# Patient Record
Sex: FEMALE | Race: WHITE | Employment: PART TIME | URBAN - METROPOLITAN AREA
[De-identification: names, ages, dates, MRNs, and addresses within clinical notes are randomized per-mention and may not be internally consistent; named-entity substitution may affect disease eponyms.]

---

## 2019-12-24 ENCOUNTER — OFFICE VISIT (OUTPATIENT)
Dept: FAMILY MEDICINE CLINIC | Age: 64
End: 2019-12-24

## 2019-12-24 VITALS
HEART RATE: 83 BPM | OXYGEN SATURATION: 96 % | SYSTOLIC BLOOD PRESSURE: 123 MMHG | DIASTOLIC BLOOD PRESSURE: 81 MMHG | HEIGHT: 62 IN | RESPIRATION RATE: 16 BRPM | BODY MASS INDEX: 37.17 KG/M2 | WEIGHT: 202 LBS | TEMPERATURE: 97.5 F

## 2019-12-24 DIAGNOSIS — M47.816 OSTEOARTHRITIS OF LUMBAR SPINE, UNSPECIFIED SPINAL OSTEOARTHRITIS COMPLICATION STATUS: ICD-10-CM

## 2019-12-24 DIAGNOSIS — F33.42 RECURRENT MAJOR DEPRESSIVE DISORDER, IN FULL REMISSION (HCC): Primary | ICD-10-CM

## 2019-12-24 DIAGNOSIS — K21.9 GASTROESOPHAGEAL REFLUX DISEASE, ESOPHAGITIS PRESENCE NOT SPECIFIED: ICD-10-CM

## 2019-12-24 DIAGNOSIS — Z79.1 ENCOUNTER FOR MONITORING CHRONIC NSAID THERAPY: ICD-10-CM

## 2019-12-24 DIAGNOSIS — Z23 ENCOUNTER FOR IMMUNIZATION: ICD-10-CM

## 2019-12-24 DIAGNOSIS — Z51.81 ENCOUNTER FOR MONITORING CHRONIC NSAID THERAPY: ICD-10-CM

## 2019-12-24 DIAGNOSIS — L40.9 PSORIASIS: ICD-10-CM

## 2019-12-24 RX ORDER — PANTOPRAZOLE SODIUM 40 MG/1
40 TABLET, DELAYED RELEASE ORAL DAILY
COMMUNITY
End: 2019-12-24 | Stop reason: SDUPTHER

## 2019-12-24 RX ORDER — IBUPROFEN 800 MG/1
TABLET ORAL
COMMUNITY
Start: 2019-12-14 | End: 2019-12-24 | Stop reason: SDUPTHER

## 2019-12-24 RX ORDER — BUPROPION HYDROCHLORIDE 150 MG/1
150 TABLET, EXTENDED RELEASE ORAL 2 TIMES DAILY
Qty: 180 TAB | Refills: 1 | Status: SHIPPED | OUTPATIENT
Start: 2019-12-24 | End: 2020-04-20 | Stop reason: SDUPTHER

## 2019-12-24 RX ORDER — IBUPROFEN 800 MG/1
800 TABLET ORAL
Qty: 90 TAB | Refills: 2 | Status: SHIPPED | OUTPATIENT
Start: 2019-12-24 | End: 2020-04-20 | Stop reason: SDUPTHER

## 2019-12-24 RX ORDER — PANTOPRAZOLE SODIUM 40 MG/1
40 TABLET, DELAYED RELEASE ORAL DAILY
Qty: 90 TAB | Refills: 1 | Status: SHIPPED | OUTPATIENT
Start: 2019-12-24 | End: 2020-04-20 | Stop reason: SDUPTHER

## 2019-12-24 RX ORDER — BETAMETHASONE DIPROPIONATE 0.5 MG/G
OINTMENT TOPICAL
COMMUNITY
Start: 2019-12-11 | End: 2021-02-26

## 2019-12-24 RX ORDER — CETIRIZINE HCL 10 MG
TABLET ORAL
COMMUNITY

## 2019-12-24 RX ORDER — BUPROPION HYDROCHLORIDE 150 MG/1
TABLET, EXTENDED RELEASE ORAL
COMMUNITY
Start: 2019-12-05 | End: 2019-12-24 | Stop reason: SDUPTHER

## 2019-12-24 NOTE — PROGRESS NOTES
Heron Mendez  71 y.o. female  1955  [id number]    [facility]  Progress Note     Encounter Date: 12/24/2019    Assessment and Plan:     Encounter Diagnoses     ICD-10-CM ICD-9-CM   1. Recurrent major depressive disorder, in full remission ([facility] [address] F33.42 296.36   2. Psoriasis L40.9 696.1   3. Osteoarthritis of lumbar spine, unspecified spinal osteoarthritis complication status W42.647 721.3   4. Gastroesophageal reflux disease, esophagitis presence not specified K21.9 530.81   5. Encounter for monitoring chronic NSAID therapy Z51.81 V58.83    Z79.1 V58.64   6. Encounter for immunization Z23 V03.89       1. Recurrent major depressive disorder, in full remission ([facility]  Stable. Continue current medication.   - buPROPion SR (WELLBUTRIN SR) 150 mg SR tablet; Take 1 Tab by mouth two (2) times a day. Dispense: 180 Tab; Refill: 1    2. Psoriasis  Release signed to obtain records from [address] [place] [address] and referral placed to [facility] office at patient's request.   - REFERRAL TO DERMATOLOGY    3. Osteoarthritis of lumbar spine, unspecified spinal osteoarthritis complication status  Patient has been on high-dose NSAID therapy for several months. She had been followed by Dr. Saima Castillo for DJD but has requested a referral for a second opinion on treatment options.   - REFERRAL TO PAIN MANAGEMENT  - ibuprofen (MOTRIN) 800 mg tablet; Take 1 Tab by mouth every eight (8) hours as needed for Pain. Dispense: 90 Tab; Refill: 2    4. Gastroesophageal reflux disease, esophagitis presence not specified  5. Encounter for monitoring chronic NSAID therapy  See problem #3  - pantoprazole (PROTONIX) 40 mg tablet; Take 1 Tab by mouth daily. Dispense: 90 Tab; Refill: 1    6. Encounter for immunization  - INFLUENZA VACCINE INACTIVATED (IIV), SUBUNIT, ADJUVANTED, IM  - [place] IMMUNIZ ADMIN,1 SINGLE/COMB VAC/TOXOID      I have discussed the diagnosis with the patient and the intended plan as seen in the above orders.   she has expressed understanding. The patient has received an after-visit summary and questions were answered concerning future plans. I have discussed medication side effects and warnings with the patient as well. Electronically Signed: Ce Villanueva MD    Current Medications after this visit     Current Outpatient Medications   Medication Sig    betamethasone dipropionate (DIPROLENE) 0.05 % ointment APPLY OINTMENT TOPICALLY TWICE DAILY TO KNEES. ALTERNATE WITH CALCIPOTRIENE    Lactobacillus acidophilus (PROBIOTIC PO) Take  by mouth.  cetirizine (ZYRTEC) 10 mg tablet Take  by mouth.  simethicone (GAS RELIEF PO) Take  by mouth.  secukinumab (COSENTYX) 150 mg/mL syrg by SubCUTAneous route.  buPROPion SR (WELLBUTRIN SR) 150 mg SR tablet Take 1 Tab by mouth two (2) times a day.  pantoprazole (PROTONIX) 40 mg tablet Take 1 Tab by mouth daily.  ibuprofen (MOTRIN) 800 mg tablet Take 1 Tab by mouth every eight (8) hours as needed for Pain. No current facility-administered medications for this visit. Medications Discontinued During This Encounter   Medication Reason    buPROPion SR (WELLBUTRIN SR) 150 mg SR tablet Reorder    pantoprazole (PROTONIX) 40 mg tablet Reorder    ibuprofen (MOTRIN) 800 mg tablet Reorder     ~~~~~~~~~~~~~~~~~~~~~~~~~~~~~~~~~~~~~~~~~~~~~~    Chief Complaint   Patient presents with   [address]       History provided by patient  History of Present Illness   Gary Beckman is a 71 y.o. female who presents to clinic today for:      [address] patient who presents to establish PCP care. I personally reviewed and updated the patient's medical, surgical, family and social history. Elsa Mendoza and SPECIAL[facility]  Dr. Gio Lo at [facility]. Patient decided to come to [address] due to insurance change. RECORDS  Provided by patient: none.       [facility]  Patient Care Team:  Tierney Jose, MD as PCP - General (Family Practice)      Establish care. Patient present with cc of [facility]. Reports a recent of depression which is controlled by wellbutrin. She has been taking protonix for GERD. She is taking  mg TID for spinal DJD  -  Patient has a history of psoriasis and had been followed by [facility] though due to insurance issues she needs a referral to a new dermatologist.     Health Maintenance  Completed by outside provider. Release for records signed.,  recommendation discussed and ordered with patient's permission. Health Maintenance Due   Topic Date Due    Hepatitis C Screening  1955    DTaP/Tdap/Td series (1 - Tdap) 05/03/1965    Shingrix Vaccine Age 50> (1 of 2) 05/03/2004    FOBT Q 1 YEAR AGE 50-75  05/03/2004    BREAST CANCER SCRN MAMMOGRAM  09/04/2014    GLAUCOMA SCREENING Q2Y  05/03/2019    Bone Densitometry (Dexa) Screening  05/03/2019    Pneumococcal 65+ years (1 of 1 - PPSV23) 05/03/2019     Review of Systems   Review of Systems   Constitutional: Negative for chills and fever. HENT: Negative for hearing loss. Respiratory: Negative for cough, hemoptysis, sputum production and shortness of breath. Cardiovascular: Negative for chest pain and palpitations. Gastrointestinal: Negative for abdominal pain, diarrhea, nausea and vomiting. Genitourinary: Negative for dysuria and urgency. Musculoskeletal: Positive for back pain. Negative for falls, joint pain and neck pain. Skin: Negative for itching and rash. Neurological: Negative for dizziness and headaches. Psychiatric/Behavioral: Negative for depression, hallucinations, substance abuse and suicidal ideas. Vitals/Objective:     Vitals:    12/24/19 1024   BP: 123/81   Pulse: 83   Resp: 16   Temp: 97.5 °F (36.4 °C)   TempSrc: Oral   SpO2: 96%   Weight: 202 lb (91.6 kg)   Height: 5' 2\" (1.575 m)     Body mass index is 36.95 kg/m².     Wt Readings from Last 3 Encounters:   12/24/19 202 lb (91.6 kg) Physical Exam  Constitutional:       General: She is not in acute distress. Appearance: Normal appearance. She is well-developed. She is obese. She is not ill-appearing or diaphoretic. HENT:      Head: Normocephalic and atraumatic. Right Ear: Tympanic membrane, ear canal and external ear normal. There is no impacted cerumen. Left Ear: Tympanic membrane, ear canal and external ear normal. There is no impacted cerumen. Mouth/Throat:      Pharynx: No oropharyngeal exudate or posterior oropharyngeal erythema. Eyes:      General:         Right eye: No discharge. Left eye: No discharge. Conjunctiva/sclera: Conjunctivae normal.      Pupils: Pupils are equal, round, and reactive to light. Cardiovascular:      Rate and Rhythm: Normal rate and regular rhythm. Heart sounds: S1 normal and S2 normal. No murmur. Pulmonary:      Effort: Pulmonary effort is normal.      Breath sounds: Normal breath sounds. No rales. Musculoskeletal:      Right lower leg: No edema. Left lower leg: No edema. Skin:     General: Skin is warm and dry. Neurological:      Mental Status: She is alert and oriented to person, place, and time. Psychiatric:         Mood and Affect: Mood normal.         Behavior: Behavior normal.          No results found for this or any previous visit (from the past 24 hour(s)). Disposition     Follow-up and Dispositions  ·   Return in about 3 months (around 3/24/2020) for Routine (Chronic Conditions). No future appointments. History   Patient's past medical, surgical and family histories were reviewed and updated.     Past Medical History:   Diagnosis Date    DJD (degenerative joint disease)     Osteoarthritis of lumbar spine 12/24/2019    Psoriasis     Recurrent major depressive disorder, in full remission ([facility] [address] 12/24/2019     Past Surgical History:   Procedure Laterality Date    HX MYOMECTOMY      HX WRIST FRACTURE TX       Family History Problem Relation Age of Onset    Hypertension Mother     Heart Attack Father      Social History     Tobacco Use    Smoking status: Former Smoker    Smokeless tobacco: Former User   Substance Use Topics    Alcohol use:  Yes     Alcohol/week: 2.0 standard drinks     Types: 2 Cans of beer per week     Comment: ocassionally    Drug use: Never       Allergies   No Known Allergies

## 2019-12-24 NOTE — PATIENT INSTRUCTIONS
Vaccine Information Statement Influenza (Flu) Vaccine (Inactivated or Recombinant): What You Need to Know Many Vaccine Information Statements are available in Portuguese and other languages. See www.immunize.org/vis Hojas de información sobre vacunas están disponibles en español y en muchos otros idiomas. Visite www.immunize.org/vis 1. Why get vaccinated? Influenza vaccine can prevent influenza (flu). Flu is a contagious disease that spreads around the [facility] every year, usually between October and May. Anyone can get the flu, but it is more dangerous for some people. Infants and young children, people 72years of age and older, pregnant women, and people with certain health conditions or a weakened immune system are at greatest risk of flu complications. Pneumonia, bronchitis, sinus infections and ear infections are examples of flu-related complications. If you have a medical condition, such as heart disease, cancer or diabetes, flu can make it worse. Flu can cause fever and chills, sore throat, muscle aches, fatigue, cough, headache, and runny or stuffy nose. Some people may have vomiting and diarrhea, though this is more common in children than adults. Each year thousands of people in the [facility] die from flu, and many more are hospitalized. Flu vaccine prevents millions of illnesses and flu-related visits to the doctor each year. 2. Influenza vaccines CDC recommends everyone 10months of age and older get vaccinated every flu season. Children 6 months through 6years of age may need 2 doses during a single flu season. Everyone else needs only 1 dose each flu season. It takes about 2 weeks for protection to develop after vaccination. There are many flu viruses, and they are always changing. Each year a new flu vaccine is made to protect against three or four viruses that are likely to cause disease in the upcoming flu season.  Even when the vaccine doesnt exactly match these viruses, it may still provide some protection. Influenza vaccine does not cause flu. Influenza vaccine may be given at the same time as other vaccines. 3. Talk with your health care provider Tell your vaccine provider if the person getting the vaccine: 
 Has had an allergic reaction after a previous dose of influenza vaccine, or has any severe, life-threatening allergies.  Has ever had Guillain-Barré Syndrome (also called GBS). In some cases, your health care provider may decide to postpone influenza vaccination to a future visit. People with minor illnesses, such as a cold, may be vaccinated. People who are moderately or severely ill should usually wait until they recover before getting influenza vaccine. Your health care provider can give you more information. 4. Risks of a reaction  Soreness, redness, and swelling where shot is given, fever, muscle aches, and headache can happen after influenza vaccine.  There may be a very small increased risk of Guillain-Barré Syndrome (GBS) after inactivated influenza vaccine (the flu shot). [facility] children who get the flu shot along with pneumococcal vaccine (PCV13), and/or DTaP vaccine at the same time might be slightly more likely to have a seizure caused by fever. Tell your health care provider if a child who is getting flu vaccine has ever had a seizure. People sometimes faint after medical procedures, including vaccination. Tell your provider if you feel dizzy or have vision changes or ringing in the ears. As with any medicine, there is a very remote chance of a vaccine causing a severe allergic reaction, other serious injury, or death. 5. What if there is a serious problem? An allergic reaction could occur after the vaccinated person leaves the clinic.  If you see signs of a severe allergic reaction (hives, swelling of the face and throat, difficulty breathing, a fast heartbeat, dizziness, or weakness), call 9-1-1 and get the person to the nearest hospital. 
 
For other signs that concern you, call your health care provider. Adverse reactions should be reported to the Vaccine Adverse Event Reporting System (VAERS). Your health care provider will usually file this report, or you can do it yourself. Visit the VAERS website at www.vaers. hhs.gov or call 1-504.123.8267. VAERS is only for reporting reactions, and VAERS staff do not give medical advice. 6. The National Vaccine Injury Compensation Program 
 
The [facility] Vaccine Injury Compensation Program (VICP) is a federal program that was created to compensate people who may have been injured by certain vaccines. Visit the VICP website at www.[facility].gov/vaccinecompensation or call 9-201.314.1774 to learn about the program and about filing a claim. There is a time limit to file a claim for compensation. 7. How can I learn more?  Ask your health care provider.  Call your local or state health department.  Contact the Centers for Disease Control and Prevention (CDC): 
- Call 7-693.715.7100 (1-800-CDC-INFO) or 
- Visit CDCs influenza website at www.cdc.gov/flu Vaccine Information Statement (Interim) Inactivated Influenza Vaccine 8/15/2019 
[address] Vital Line [id number]-53 Department of [facility] and [employer] Centers for Disease Control and Prevention Office Use Only

## 2019-12-24 NOTE — PROGRESS NOTES
Identified pt with two pt identifiers(name and ). Reviewed record in preparation for visit and have obtained necessary documentation. Chief Complaint   Patient presents with   [address] Maintenance Due   Topic    Hepatitis C Screening     DTaP/Tdap/Td series (1 - Tdap)    Shingrix Vaccine Age 50> (1 of 2)    FOBT Q 1 YEAR AGE 54-65     BREAST CANCER SCRN MAMMOGRAM     GLAUCOMA SCREENING Q2Y     Bone Densitometry (Dexa) Screening     Pneumococcal 65+ years (1 of 1 - PPSV23)    Influenza Age 5 to Adult    -Pt accepts flu shot  -Pt received Pneumococcal  In 2019    Coordination of Care Questionnaire:  :   1) Have you been to an emergency room, urgent care, or hospitalized since your last visit? If yes, where when, and reason for visit? [facility] for thrush       2. Have seen or consulted any other health care provider since your last visit? If yes, where when, and reason for visit? Dr. Khari Pro        Patient is accompanied by self  I have received verbal consent from Teja Brennan to discuss any/all medical information while they are present in the room.

## 2019-12-30 ENCOUNTER — DOCUMENTATION ONLY (OUTPATIENT)
Dept: FAMILY MEDICINE CLINIC | Age: 64
End: 2019-12-30

## 2019-12-30 NOTE — PROGRESS NOTES
Authorization Health Release Forms completed and faxed by 12/27/2019 to    [facility]- Dermatology- (f): [phone]) Gabriel Marquez): [phone]  Dr. Rigo Padilla): [phone]    Confirmation: OK

## 2020-01-02 ENCOUNTER — DOCUMENTATION ONLY (OUTPATIENT)
Dept: FAMILY MEDICINE CLINIC | Age: 65
End: 2020-01-02

## 2020-01-02 NOTE — PROGRESS NOTES
Authorization Health Release Health Forms completed and faxed by 1/2/2020 to   Dr. Maia Hays): [phone]    Confirmation: OK

## 2020-01-08 PROBLEM — J42 CHRONIC BRONCHITIS (HCC): Status: ACTIVE | Noted: 2020-01-08

## 2020-01-08 PROBLEM — F34.1 DYSTHYMIC: Status: ACTIVE | Noted: 2019-12-24

## 2020-01-09 ENCOUNTER — HOSPITAL ENCOUNTER (OUTPATIENT)
Dept: GENERAL RADIOLOGY | Age: 65
Discharge: HOME | End: 2020-01-09
Attending: PHYSICAL MEDICINE & REHABILITATION
Payer: MEDICARE

## 2020-01-09 DIAGNOSIS — M25.552 PAIN IN LEFT HIP: ICD-10-CM

## 2020-01-09 DIAGNOSIS — M54.10 RADICULOPATHY: ICD-10-CM

## 2020-01-09 DIAGNOSIS — M54.50 LOW BACK PAIN: ICD-10-CM

## 2020-01-09 PROCEDURE — 73502 X-RAY EXAM HIP UNI 2-3 VIEWS: CPT

## 2020-02-25 ENCOUNTER — HOSPITAL ENCOUNTER (OUTPATIENT)
Dept: MRI IMAGING | Age: 65
Discharge: HOME | End: 2020-02-25
Attending: PHYSICAL MEDICINE & REHABILITATION
Payer: MEDICARE

## 2020-02-25 ENCOUNTER — HOSPITAL ENCOUNTER (OUTPATIENT)
Dept: GENERAL RADIOLOGY | Age: 65
Discharge: HOME | End: 2020-02-25
Attending: PHYSICAL MEDICINE & REHABILITATION
Payer: MEDICARE

## 2020-02-25 DIAGNOSIS — M25.552 PAIN IN LEFT HIP: ICD-10-CM

## 2020-02-25 DIAGNOSIS — M16.12 UNILATERAL PRIMARY OSTEOARTHRITIS, LEFT HIP: ICD-10-CM

## 2020-02-25 PROCEDURE — 73721 MRI JNT OF LWR EXTRE W/O DYE: CPT

## 2020-02-25 PROCEDURE — 72114 X-RAY EXAM L-S SPINE BENDING: CPT

## 2020-04-20 ENCOUNTER — TELEPHONE (OUTPATIENT)
Dept: FAMILY MEDICINE CLINIC | Age: 65
End: 2020-04-20

## 2020-04-20 ENCOUNTER — VIRTUAL VISIT (OUTPATIENT)
Dept: FAMILY MEDICINE CLINIC | Age: 65
End: 2020-04-20

## 2020-04-20 VITALS — BODY MASS INDEX: 37.17 KG/M2 | HEIGHT: 62 IN | WEIGHT: 202 LBS

## 2020-04-20 DIAGNOSIS — F33.42 RECURRENT MAJOR DEPRESSIVE DISORDER, IN FULL REMISSION (HCC): ICD-10-CM

## 2020-04-20 DIAGNOSIS — Z51.81 ENCOUNTER FOR MONITORING CHRONIC NSAID THERAPY: ICD-10-CM

## 2020-04-20 DIAGNOSIS — M47.816 OSTEOARTHRITIS OF LUMBAR SPINE, UNSPECIFIED SPINAL OSTEOARTHRITIS COMPLICATION STATUS: Primary | ICD-10-CM

## 2020-04-20 DIAGNOSIS — Z79.1 ENCOUNTER FOR MONITORING CHRONIC NSAID THERAPY: ICD-10-CM

## 2020-04-20 RX ORDER — PREGABALIN 25 MG/1
25 CAPSULE ORAL 2 TIMES DAILY
Qty: 14 CAP | Refills: 0 | Status: SHIPPED | OUTPATIENT
Start: 2020-04-20 | End: 2020-05-08 | Stop reason: SINTOL

## 2020-04-20 RX ORDER — PANTOPRAZOLE SODIUM 40 MG/1
40 TABLET, DELAYED RELEASE ORAL DAILY
Qty: 90 TAB | Refills: 1 | Status: SHIPPED | OUTPATIENT
Start: 2020-04-20 | End: 2021-02-26 | Stop reason: SDUPTHER

## 2020-04-20 RX ORDER — BUPROPION HYDROCHLORIDE 150 MG/1
150 TABLET, EXTENDED RELEASE ORAL 2 TIMES DAILY
Qty: 180 TAB | Refills: 0 | Status: SHIPPED | OUTPATIENT
Start: 2020-04-20 | End: 2020-04-20 | Stop reason: SDUPTHER

## 2020-04-20 RX ORDER — IBUPROFEN 800 MG/1
800 TABLET ORAL
Qty: 90 TAB | Refills: 2 | Status: SHIPPED | OUTPATIENT
Start: 2020-04-20 | End: 2020-06-30

## 2020-04-20 RX ORDER — BUPROPION HYDROCHLORIDE 150 MG/1
150 TABLET, EXTENDED RELEASE ORAL 2 TIMES DAILY
Qty: 180 TAB | Refills: 0 | Status: SHIPPED | OUTPATIENT
Start: 2020-04-20 | End: 2021-02-26 | Stop reason: SDUPTHER

## 2020-04-20 NOTE — TELEPHONE ENCOUNTER
Pt called stating the 4 rxs that were sent in today, ibuprofen 800mg, pregabalin 25mg, pantoprazole 40mg, bupropion SR 150mg, require Pas. She stated she is in a lot of pain and really needs the pregabalin.

## 2020-04-20 NOTE — PROGRESS NOTES
Identified pt with two pt identifiers(name and ). Reviewed record in preparation for visit and have obtained necessary documentation. Chief Complaint   Patient presents with    Medication Refill    Leg Pain     pain radiates from spine to her leg        Health Maintenance Due   Topic    Hepatitis C Screening     Lipid Screen     Shingrix Vaccine Age 50> (1 of 2)    Breast Cancer Screen Mammogram     GLAUCOMA SCREENING Q2Y     Bone Densitometry (Dexa) Screening     Pneumococcal 65+ years (2 of 2 - PPSV23)    Medicare Yearly Exam        Visit Vitals  Height 5' 2\" (1.575 m)   Weight 202 lb (91.6 kg)   Body Mass Index 36.95 kg/m²         Coordination of Care Questionnaire:  :   1) Have you been to an emergency room, urgent care, or hospitalized since your last visit? NO      2. Have seen or consulted any other health care provider since your last visit? NO          Patient is accompanied by SELF I have received verbal consent from Clayborne Kussmaul to discuss any/all medical information while they are present in the room.

## 2020-04-20 NOTE — PROGRESS NOTES
Zion Rivera  71 y.o. female  1955  [id number]    [facility] FAMILY MEDICINE  Progress Note     Encounter Date: 4/20/2020    Zion Rivera is a 71 y.o. female who was seen by synchronous (real-time) audio-video technology on 4/20/2020. She and/or her healthcare decision maker is aware that this patient-initiated Telehealth encounter is a billable service, with coverage as determined by her insurance carrier. She  is aware that she may receive a bill and has provided verbal consent to proceed: Yes    I was at home while conducting this encounter. The patient was checked in/\"roomed\" by Greig Jeans, CMA via telephone in the office. The patient was at home during the encounter. This visit was completed virtually using Doxy. me  Assessment and Plan:     Encounter Diagnoses     ICD-10-CM ICD-9-CM   1. Osteoarthritis of lumbar spine, unspecified spinal osteoarthritis complication status S84.816 721.3   2. Recurrent major depressive disorder, in full remission ([facility] [address] F33.42 296.36   3. Encounter for monitoring chronic NSAID therapy Z51.81 V58.83    Z79.1 V58.64       1. Osteoarthritis of lumbar spine, unspecified spinal osteoarthritis complication status  Patient to call orthopedic to discuss pain medication until she can complete MRI study and further management. Trial of lyrica at low dose for pain; patient was unable to tolerate gabapentin. - ibuprofen (MOTRIN) 800 mg tablet; Take 1 Tab by mouth every eight (8) hours as needed for Pain. Dispense: 90 Tab; Refill: 2  - pregabalin (LYRICA) 25 mg capsule; Take 1 Cap by mouth two (2) times a day. Max Daily Amount: 50 mg. Dispense: 14 Cap; Refill: 0    2. Recurrent major depressive disorder, in full remission ([facility] [address]  Mood is stable. - buPROPion SR (WELLBUTRIN SR) 150 mg SR tablet; Take 1 Tab by mouth two (2) times a day. Dispense: 180 Tab; Refill: 0    3.  Encounter for monitoring chronic NSAID therapy  Continue PPI while continuing NSAID therapy. - pantoprazole (PROTONIX) 40 mg tablet; Take 1 Tab by mouth daily. Dispense: 90 Tab; Refill: 1      We discussed the expected course, resolution and complications of the diagnosis(es) in detail. Medication risks, benefits, costs, interactions, and alternatives were discussed as indicated. I advised her to contact the office if her condition worsens, changes or fails to improve as anticipated. She expressed understanding with the diagnosis(es) and plan. CPT Codes 05065-70353 for Established Patients may apply to this Telehealth Visit  Time-based coding, delete if not needed: I spent at least 25 minutes with this established patient, and >50% of the time was spent counseling and/or coordinating care regarding back pain and depression    Pursuant to the emergency declaration under the [address] and the [place] [address] [facility] authority and the [employer] Act, this Virtual  Visit was conducted, with patient's consent, to reduce the patient's risk of exposure to COVID-19 and provide continuity of care for an established patient. Services were provided through a video synchronous discussion virtually to substitute for in-person clinic visit. Electronically Signed: Mosie Snellen, MD    Current Medications after this visit     Current Outpatient Medications   Medication Sig    ibuprofen (MOTRIN) 800 mg tablet Take 1 Tab by mouth every eight (8) hours as needed for Pain.  pregabalin (LYRICA) 25 mg capsule Take 1 Cap by mouth two (2) times a day. Max Daily Amount: 50 mg.  pantoprazole (PROTONIX) 40 mg tablet Take 1 Tab by mouth daily.  buPROPion SR (WELLBUTRIN SR) 150 mg SR tablet Take 1 Tab by mouth two (2) times a day.  Lactobacillus acidophilus (PROBIOTIC PO) Take  by mouth.  cetirizine (ZYRTEC) 10 mg tablet Take  by mouth.  simethicone (GAS RELIEF PO) Take  by mouth.     betamethasone dipropionate (DIPROLENE) 0.05 % ointment APPLY OINTMENT TOPICALLY TWICE DAILY TO KNEES. ALTERNATE WITH CALCIPOTRIENE    secukinumab (COSENTYX) 150 mg/mL syrg by SubCUTAneous route. No current facility-administered medications for this visit. Medications Discontinued During This Encounter   Medication Reason    ibuprofen (MOTRIN) 800 mg tablet Reorder    buPROPion SR (WELLBUTRIN SR) 150 mg SR tablet Reorder    pantoprazole (PROTONIX) 40 mg tablet Reorder    buPROPion SR (WELLBUTRIN SR) 150 mg SR tablet Reorder     ~~~~~~~~~~~~~~~~~~~~~~~~~~~~~~~~~~~~~~~~~~~~~~    Chief Complaint   Patient presents with    Medication Refill    Leg Pain     pain radiates from spine to her leg       History of Present Illness   Bryan Baez is a 71 y.o. female who presents for:    Depression Review:  Patient is seen for depression. Ongoig symptoms include:  none. Patient denies: anhedonia, depressed mood, hopelessness, poor concentration, psychomotor agitation and psychomotor retardation suicidal ideation, homocidal ideation. Current treatment includes Wellbutrin and no other therapies. Reported side effects from the treatment: none    Back pain  Patient present with cc of back pain. Patient reports that she has been followed by pain and orthopedic surgeon. Dr. Shea Salinas has been following and has bee treating her with tramadol and gabapentin. She states that the gabapentin did not make her feel right and she was stopped taking the medication. Gastroesophageal Reflux:  Current control of Symptoms: Controlled  Hiatal Hernia: No  Current Medications:omeprazole  The patient has no history melena or bright red blood in the stools. The patient is aware of diet changes needed, elevating the head of the bed and appropriate use of antacids. Review of Systems   Review of Systems   Constitutional: Negative for chills and fever. HENT: Negative for congestion, ear discharge and sore throat.     Eyes: Negative for double vision, photophobia and discharge. Respiratory: Negative for cough, sputum production, shortness of breath and wheezing. Cardiovascular: Negative for chest pain, palpitations and leg swelling. Gastrointestinal: Negative for diarrhea, nausea and vomiting. Genitourinary: Negative for dysuria and urgency. Musculoskeletal: Positive for back pain. Skin: Negative. Neurological: Positive for tingling. Negative for dizziness, tremors, speech change, focal weakness and headaches. Vitals/Objective:     Due to this being a TeleHealth evaluation, many elements of the physical examination are unable to be assessed. Physical Exam  Constitutional:       General: She is not in acute distress. Appearance: Normal appearance. She is not ill-appearing. HENT:      Head: Normocephalic and atraumatic. Right Ear: External ear normal.      Left Ear: External ear normal.      Mouth/Throat:      Mouth: Mucous membranes are moist.   Eyes:      General:         Right eye: No discharge. Left eye: No discharge. Extraocular Movements: Extraocular movements intact. Conjunctiva/sclera: Conjunctivae normal.   Neck:      Musculoskeletal: Normal range of motion. Pulmonary:      Effort: Pulmonary effort is normal.      Comments: No visualized signs of difficulty breathing or respiratory distress  Skin:     Coloration: Skin is not pale. Findings: No erythema or rash. Neurological:      Mental Status: She is alert and oriented to person, place, and time. Cranial Nerves: No cranial nerve deficit ( No Facial Asymmetry (Cranial nerve 7 motor function) (limited exam due to video visit) ). Comments: Able to follow commands    Psychiatric:         Mood and Affect: Mood normal.         Behavior: Behavior normal.         No results found for this or any previous visit (from the past 24 hour(s)).    Disposition     Future Appointments   Date Time Provider Alyce Valencia   5/28/2020  5:00 PM Isabell Stiles MRI 1 [facility]       History   Patient's past medical, surgical and family histories were reviewed and updated. Past Medical History:   Diagnosis Date    DJD (degenerative joint disease)     Osteoarthritis of lumbar spine 12/24/2019    Psoriasis     Recurrent major depressive disorder, in full remission ([facility] [address] 12/24/2019     Past Surgical History:   Procedure Laterality Date    HX MYOMECTOMY      HX WRIST FRACTURE [place]       Family History   Problem Relation Age of Onset    Hypertension Mother     Heart Attack Father      Social History     Tobacco Use    Smoking status: Former Smoker    Smokeless tobacco: Former User   Substance Use Topics    Alcohol use:  Yes     Alcohol/week: 2.0 standard drinks     Types: 2 Cans of beer per week     Comment: ocassionally    Drug use: Never       Allergies     Allergies   Allergen Reactions    Gabapentin Palpitations

## 2020-04-22 ENCOUNTER — DOCUMENTATION ONLY (OUTPATIENT)
Dept: FAMILY MEDICINE CLINIC | Age: 65
End: 2020-04-22

## 2020-05-08 ENCOUNTER — TELEPHONE (OUTPATIENT)
Dept: FAMILY MEDICINE CLINIC | Age: 65
End: 2020-05-08

## 2020-05-08 ENCOUNTER — VIRTUAL VISIT (OUTPATIENT)
Dept: FAMILY MEDICINE CLINIC | Age: 65
End: 2020-05-08

## 2020-05-08 DIAGNOSIS — M47.816 OSTEOARTHRITIS OF LUMBAR SPINE, UNSPECIFIED SPINAL OSTEOARTHRITIS COMPLICATION STATUS: Primary | ICD-10-CM

## 2020-05-08 RX ORDER — CYCLOBENZAPRINE HCL 5 MG
5-10 TABLET ORAL
Qty: 90 TAB | Refills: 0 | Status: SHIPPED | OUTPATIENT
Start: 2020-05-08 | End: 2020-05-08 | Stop reason: CLARIF

## 2020-05-08 RX ORDER — CYCLOBENZAPRINE HYDROCHLORIDE 15 MG/1
15 CAPSULE, EXTENDED RELEASE ORAL
Qty: 30 CAP | Refills: 0 | Status: SHIPPED | OUTPATIENT
Start: 2020-05-08 | End: 2021-02-26 | Stop reason: SDUPTHER

## 2020-05-08 NOTE — PROGRESS NOTES
Lashon Dee  76 y.o. female  1955  [id number]    [address] [place] [address]  Progress Note     Encounter Date: 5/8/2020    Lashon Dee is a 76 y.o. female who was seen by synchronous (real-time) audio-video technology on 5/8/2020. She and/or her healthcare decision maker is aware that this patient-initiated Telehealth encounter is a billable service, with coverage as determined by her insurance carrier. She  is aware that she may receive a bill and has provided verbal consent to proceed: Yes    I was at home while conducting this encounter. The patient was checked in/\"roomed\" by Peri Ma CMA via telephone in the office. The patient was at home during the encounter. This visit was completed virtually using Doxy. me  Assessment and Plan:     Encounter Diagnoses     ICD-10-CM ICD-9-CM   1. Osteoarthritis of lumbar spine, unspecified spinal osteoarthritis complication status E88.967 721.3       1. Osteoarthritis of lumbar spine, unspecified spinal osteoarthritis complication status  Patient unable to tolerate lyrica. Plan is for MRI of back on 5/12/2020 then follow up with orthopedics. - cyclobenzaprine (FLEXERIL) 5 mg tablet; Take 1-2 Tabs by mouth three (3) times daily as needed for Muscle Spasm(s). Dispense: 90 Tab; Refill: 0      We discussed the expected course, resolution and complications of the diagnosis(es) in detail. Medication risks, benefits, costs, interactions, and alternatives were discussed as indicated. I advised her to contact the office if her condition worsens, changes or fails to improve as anticipated. She expressed understanding with the diagnosis(es) and plan.      CPT Codes 50418-75050 for Established Patients may apply to this Telehealth Visit      Pursuant to the emergency declaration under the [address] [place] [address] and the [employer] Act, this Virtual  Visit was conducted, with patient's consent, to reduce the patient's risk of exposure to COVID-19 and provide continuity of care for an established patient. Services were provided through a video synchronous discussion virtually to substitute for in-person clinic visit. Electronically Signed: Cinthia Dexter MD    Current Medications after this visit     Current Outpatient Medications   Medication Sig    GUSELKUMAB  mg by SubCUTAneous route every two (2) months.  cyclobenzaprine (FLEXERIL) 5 mg tablet Take 1-2 Tabs by mouth three (3) times daily as needed for Muscle Spasm(s).  ibuprofen (MOTRIN) 800 mg tablet Take 1 Tab by mouth every eight (8) hours as needed for Pain.  pantoprazole (PROTONIX) 40 mg tablet Take 1 Tab by mouth daily.  buPROPion SR (WELLBUTRIN SR) 150 mg SR tablet Take 1 Tab by mouth two (2) times a day.  betamethasone dipropionate (DIPROLENE) 0.05 % ointment APPLY OINTMENT TOPICALLY TWICE DAILY TO KNEES. ALTERNATE WITH CALCIPOTRIENE    Lactobacillus acidophilus (PROBIOTIC PO) Take  by mouth.  cetirizine (ZYRTEC) 10 mg tablet Take  by mouth.  simethicone (GAS RELIEF PO) Take  by mouth. No current facility-administered medications for this visit. Medications Discontinued During This Encounter   Medication Reason    pregabalin (LYRICA) 25 mg capsule Side Effects    secukinumab (COSENTYX) 150 mg/mL syrg Alternate Therapy     ~~~~~~~~~~~~~~~~~~~~~~~~~~~~~~~~~~~~~~~~~~~~~~    Chief Complaint   Patient presents with    Medication Refill     Needs something for pain       Hip pain radiating to legs       History of Present Illness   Zion Rivera is a 76 y.o. female who presents for:    Back pain  Patient present with cc of back pain. Patient reports that she has been followed by pain and orthopedic surgeon. Dr. Nilsa Greer has been following and has been treating her with tramadol and gabapentin (discontinued due to side effects).  At last appt on 4/20/2020, trial of lyrica was prescribed. She had tried lyrica but had severe dizziness. Review of Systems   Review of Systems   Constitutional: Negative for chills and fever. Cardiovascular: Negative for chest pain and palpitations. Gastrointestinal: Negative for abdominal pain, blood in stool, constipation, diarrhea, nausea and vomiting. Musculoskeletal: Positive for back pain. Negative for falls, joint pain and neck pain. Skin: Negative for itching and rash. Neurological: Positive for tingling. Negative for dizziness, tremors, sensory change, focal weakness, seizures, weakness and headaches. Vitals/Objective:     Due to this being a TeleHealth evaluation, many elements of the physical examination are unable to be assessed. Physical Exam  Constitutional:       General: She is not in acute distress. Appearance: Normal appearance. She is obese. She is not ill-appearing, toxic-appearing or diaphoretic. HENT:      Head: Normocephalic and atraumatic. Right Ear: External ear normal.      Left Ear: External ear normal.      Mouth/Throat:      Mouth: Mucous membranes are moist.   Eyes:      General:         Right eye: No discharge. Left eye: No discharge. Extraocular Movements: Extraocular movements intact. Conjunctiva/sclera: Conjunctivae normal.   Neck:      Musculoskeletal: Normal range of motion. Pulmonary:      Effort: Pulmonary effort is normal.      Comments: No visualized signs of difficulty breathing or respiratory distress  Skin:     Coloration: Skin is not pale. Findings: No erythema or rash. Neurological:      Mental Status: She is alert and oriented to person, place, and time. Cranial Nerves: No cranial nerve deficit ( No Facial Asymmetry (Cranial nerve 7 motor function) (limited exam due to video visit) ).       Comments: Able to follow commands    Psychiatric:         Mood and Affect: Mood normal.         Behavior: Behavior normal. No results found for this or any previous visit (from the past 24 hour(s)). Disposition     Follow-up and Dispositions  ·   Return if symptoms worsen or fail to improve. Future Appointments   Date Time Provider Alyce Valencia   5/12/2020  7:15 PM [address] MRI [facility]       History   Patient's past medical, surgical and family histories were reviewed and updated. Past Medical History:   Diagnosis Date    DJD (degenerative joint disease)     Osteoarthritis of lumbar spine 12/24/2019    Psoriasis     Recurrent major depressive disorder, in full remission ([facility] [address] 12/24/2019     Past Surgical History:   Procedure Laterality Date    HX MYOMECTOMY      HX WRIST FRACTURE [place]       Family History   Problem Relation Age of Onset    Hypertension Mother     Heart Attack Father      Social History     Tobacco Use    Smoking status: Former Smoker    Smokeless tobacco: Former User   Substance Use Topics    Alcohol use:  Yes     Alcohol/week: 2.0 standard drinks     Types: 2 Cans of beer per week     Comment: ocassionally    Drug use: Never       Allergies     Allergies   Allergen Reactions    Gabapentin Palpitations    Procaine Other (comments)     Had a shot in mouth and felt like eye was going to pop out of her head

## 2020-05-12 ENCOUNTER — HOSPITAL ENCOUNTER (OUTPATIENT)
Dept: MRI IMAGING | Age: 65
Discharge: HOME | End: 2020-05-12
Attending: ORTHOPAEDIC SURGERY
Payer: MEDICARE

## 2020-05-12 DIAGNOSIS — M54.16 LUMBAR RADICULOPATHY: ICD-10-CM

## 2020-05-12 DIAGNOSIS — M48.062 PSEUDOCLAUDICATION SYNDROME: ICD-10-CM

## 2020-05-12 PROCEDURE — 72148 MRI LUMBAR SPINE W/O DYE: CPT

## 2020-05-13 ENCOUNTER — DOCUMENTATION ONLY (OUTPATIENT)
Dept: FAMILY MEDICINE CLINIC | Age: 65
End: 2020-05-13

## 2020-05-20 ENCOUNTER — DOCUMENTATION ONLY (OUTPATIENT)
Dept: FAMILY MEDICINE CLINIC | Age: 65
End: 2020-05-20

## 2020-05-21 ENCOUNTER — DOCUMENTATION ONLY (OUTPATIENT)
Dept: FAMILY MEDICINE CLINIC | Age: 65
End: 2020-05-21

## 2020-06-30 ENCOUNTER — VIRTUAL VISIT (OUTPATIENT)
Dept: FAMILY MEDICINE CLINIC | Age: 65
End: 2020-06-30

## 2020-06-30 DIAGNOSIS — M47.816 OSTEOARTHRITIS OF LUMBAR SPINE, UNSPECIFIED SPINAL OSTEOARTHRITIS COMPLICATION STATUS: Primary | ICD-10-CM

## 2020-06-30 RX ORDER — PREDNISONE 20 MG/1
40 TABLET ORAL
Qty: 10 TAB | Refills: 0 | Status: SHIPPED | OUTPATIENT
Start: 2020-06-30 | End: 2020-07-05

## 2020-06-30 RX ORDER — METHOCARBAMOL 750 MG/1
750 TABLET, FILM COATED ORAL
COMMUNITY
Start: 2020-06-17 | End: 2020-07-28 | Stop reason: SDUPTHER

## 2020-06-30 NOTE — PROGRESS NOTES
Identified pt with two pt identifiers(name and ). Reviewed record in preparation for visit and have obtained necessary documentation. Chief Complaint   Patient presents with    Medication Evaluation    Other     Pt requesting a referral for Chiropractor         Health Maintenance Due   Topic    Hepatitis C Screening     Lipid Screen     Shingrix Vaccine Age 50> (1 of 2)    Breast Cancer Screen Mammogram     GLAUCOMA SCREENING Q2Y     Bone Densitometry (Dexa) Screening     Pneumococcal 65+ years (2 of 2 - PPSV23)    Medicare Yearly Exam        Coordination of Care Questionnaire:  :   1) Have you been to an emergency room, urgent care, or hospitalized since your last visit? If yes, where when, and reason for visit? no      2. Have seen or consulted any other health care provider since your last visit? If yes, where when, and reason for visit? Yes,   spinal- Dr. Wilfred Pierre        Patient is accompanied by self I have received verbal consent from Cordie Apley to discuss any/all medical information while they are present in the room.

## 2020-07-28 ENCOUNTER — TELEPHONE (OUTPATIENT)
Dept: FAMILY MEDICINE CLINIC | Age: 65
End: 2020-07-28

## 2020-07-28 RX ORDER — METHOCARBAMOL 750 MG/1
750 TABLET, FILM COATED ORAL 3 TIMES DAILY
Qty: 90 TAB | Refills: 1 | Status: SHIPPED | OUTPATIENT
Start: 2020-07-28 | End: 2020-08-13 | Stop reason: SDUPTHER

## 2020-08-10 DIAGNOSIS — M47.816 OSTEOARTHRITIS OF LUMBAR SPINE, UNSPECIFIED SPINAL OSTEOARTHRITIS COMPLICATION STATUS: ICD-10-CM

## 2020-08-10 RX ORDER — IBUPROFEN 800 MG/1
TABLET ORAL
Qty: 90 TAB | Refills: 2 | OUTPATIENT
Start: 2020-08-10

## 2020-08-10 RX ORDER — CYCLOBENZAPRINE HYDROCHLORIDE 15 MG/1
CAPSULE, EXTENDED RELEASE ORAL
Qty: 30 CAP | Refills: 0 | OUTPATIENT
Start: 2020-08-10

## 2020-10-06 ENCOUNTER — OFFICE VISIT (OUTPATIENT)
Dept: FAMILY MEDICINE CLINIC | Age: 65
End: 2020-10-06
Payer: MEDICARE

## 2020-10-06 VITALS
HEIGHT: 62 IN | WEIGHT: 200.6 LBS | RESPIRATION RATE: 16 BRPM | DIASTOLIC BLOOD PRESSURE: 64 MMHG | OXYGEN SATURATION: 96 % | TEMPERATURE: 97.7 F | HEART RATE: 90 BPM | SYSTOLIC BLOOD PRESSURE: 127 MMHG | BODY MASS INDEX: 36.91 KG/M2

## 2020-10-06 DIAGNOSIS — G89.29 CHRONIC RIGHT SHOULDER PAIN: Primary | ICD-10-CM

## 2020-10-06 DIAGNOSIS — B37.9 ANTIBIOTIC-INDUCED YEAST INFECTION: ICD-10-CM

## 2020-10-06 DIAGNOSIS — Z23 NEEDS FLU SHOT: ICD-10-CM

## 2020-10-06 DIAGNOSIS — M25.511 CHRONIC RIGHT SHOULDER PAIN: Primary | ICD-10-CM

## 2020-10-06 DIAGNOSIS — T36.95XA ANTIBIOTIC-INDUCED YEAST INFECTION: ICD-10-CM

## 2020-10-06 DIAGNOSIS — Z23 ENCOUNTER FOR IMMUNIZATION: ICD-10-CM

## 2020-10-06 PROCEDURE — G8536 NO DOC ELDER MAL SCRN: HCPCS | Performed by: FAMILY MEDICINE

## 2020-10-06 PROCEDURE — G8427 DOCREV CUR MEDS BY ELIG CLIN: HCPCS | Performed by: FAMILY MEDICINE

## 2020-10-06 PROCEDURE — 90694 VACC AIIV4 NO PRSRV 0.5ML IM: CPT | Performed by: FAMILY MEDICINE

## 2020-10-06 PROCEDURE — 1100F PTFALLS ASSESS-DOCD GE2>/YR: CPT | Performed by: FAMILY MEDICINE

## 2020-10-06 PROCEDURE — G9717 DOC PT DX DEP/BP F/U NT REQ: HCPCS | Performed by: FAMILY MEDICINE

## 2020-10-06 PROCEDURE — 1090F PRES/ABSN URINE INCON ASSESS: CPT | Performed by: FAMILY MEDICINE

## 2020-10-06 PROCEDURE — 3288F FALL RISK ASSESSMENT DOCD: CPT | Performed by: FAMILY MEDICINE

## 2020-10-06 PROCEDURE — 3017F COLORECTAL CA SCREEN DOC REV: CPT | Performed by: FAMILY MEDICINE

## 2020-10-06 PROCEDURE — G0008 ADMIN INFLUENZA VIRUS VAC: HCPCS | Performed by: FAMILY MEDICINE

## 2020-10-06 PROCEDURE — G8400 PT W/DXA NO RESULTS DOC: HCPCS | Performed by: FAMILY MEDICINE

## 2020-10-06 PROCEDURE — 99214 OFFICE O/P EST MOD 30 MIN: CPT | Performed by: FAMILY MEDICINE

## 2020-10-06 PROCEDURE — G8417 CALC BMI ABV UP PARAM F/U: HCPCS | Performed by: FAMILY MEDICINE

## 2020-10-06 RX ORDER — FLUCONAZOLE 150 MG/1
150 TABLET ORAL DAILY
Qty: 1 TAB | Refills: 0 | Status: SHIPPED | OUTPATIENT
Start: 2020-10-06 | End: 2020-10-07

## 2020-10-06 RX ORDER — IBUPROFEN 600 MG/1
600 TABLET ORAL
Qty: 30 TAB | Refills: 0 | Status: SHIPPED | OUTPATIENT
Start: 2020-10-06 | End: 2020-10-16

## 2020-10-06 NOTE — PROGRESS NOTES
Sandy Moreno  76 y.o. female  1955  BAK:[id number]    [facility] MEDICINE  Progress Note     Encounter Date: 10/6/2020    Assessment and Plan:     Encounter Diagnoses     ICD-10-CM ICD-9-CM   1. Chronic right shoulder pain  M25.511 719.41    G89.29 338.29   2. Antibiotic-induced yeast infection  B37.9 112.9    T36.95XA E930.9   3. Needs flu shot  Z23 V04.81   4. Encounter for immunization  Z23 V03.89     1. Chronic right shoulder pain  Patient to resume NSAIDs for the next 7-10 days and [facility] for [facility] to specialist.   - ibuprofen (MOTRIN) 600 mg tablet; Take 1 Tab by mouth every six (6) hours as needed for Pain for up to 10 days. Dispense: 30 Tab; Refill: 0  - REFERRAL TO ORTHOPEDICS    2. Antibiotic-induced yeast infection  - fluconazole (DIFLUCAN) 150 mg tablet; Take 1 Tab by mouth daily for 1 day. FDA advises cautious prescribing of oral fluconazole in pregnancy. Dispense: 1 Tab; Refill: 0    3. Needs flu shot  - FLU (FLUAD QUAD INFLUENZA VACCINE,QUAD,ADJUVANTED)    4. Encounter for immunization  - pneumococcal 23-sebas ps vaccine (PNEUMOVAX 23) 25 mcg/0.5 mL injection; 0.5 mL by IntraMUSCular route once for 1 dose. Please fax confirmation to the attn of prescribing provider at above fax number. Indications: prevention of Streptococcus pneumoniae infection  Dispense: 0.5 mL; Refill: 0      I have discussed the diagnosis with the patient and the intended plan as seen in the above orders. she has expressed understanding. The patient has received an after-visit summary and questions were answered concerning future plans. I have discussed medication side effects and warnings with the patient as well. Electronically Signed: Damon Chavez MD    Current Medications after this visit     Current Outpatient Medications   Medication Sig    guselkumab (TREMFYA SC) by SubCUTAneous route. 1 injection every other month. Prescribed by Dermatology.     cranberry extract 450 mg tab tablet Take 450 mg by mouth.  pneumococcal 23-sebas ps vaccine (PNEUMOVAX 23) 25 mcg/0.5 mL injection 0.5 mL by IntraMUSCular route once for 1 dose. Please fax confirmation to the attn of prescribing provider at above fax number. Indications: prevention of Streptococcus pneumoniae infection    fluconazole (DIFLUCAN) 150 mg tablet Take 1 Tab by mouth daily for 1 day. FDA advises cautious prescribing of oral fluconazole in pregnancy.  ibuprofen (MOTRIN) 600 mg tablet Take 1 Tab by mouth every six (6) hours as needed for Pain for up to 10 days.  pantoprazole (PROTONIX) 40 mg tablet Take 1 Tab by mouth daily.  buPROPion SR (WELLBUTRIN SR) 150 mg SR tablet Take 1 Tab by mouth two (2) times a day.  Lactobacillus acidophilus (PROBIOTIC PO) Take  by mouth.  cetirizine (ZYRTEC) 10 mg tablet Take  by mouth.  simethicone (GAS RELIEF PO) Take  by mouth.  methocarbamoL (ROBAXIN) 750 mg tablet Take 1 Tab by mouth three (3) times daily.  cyclobenzaprine (AMRIX) 15 mg cp24 SR capsule Take 1 Cap by mouth daily as needed for Muscle Spasm(s).  betamethasone dipropionate (DIPROLENE) 0.05 % ointment APPLY OINTMENT TOPICALLY TWICE DAILY TO KNEES. ALTERNATE WITH CALCIPOTRIENE     No current facility-administered medications for this visit. Medications Discontinued During This Encounter   Medication Reason   [facility] Not A Current Medication     ~~~~~~~~~~~~~~~~~~~~~~~~~~~~~~~~~~~~~~~~~~~~~~    Chief Complaint   Patient presents with    Shoulder Pain       History provided by patient  History of Present Illness   Demar Del Rio is a 76 y.o. female who presents to clinic today for:    Right shoulder pain  Patient present with cc of right shoulder pain x months. Patient cannot recall when exactly the pain started. Pain is constant and worsen with lifting of objects. She has been taking tylenol for pain in back though it does not help with the pain.  Pain has been gradually worsening. Vaginal discharge  Patient reports that she has been on 3 different abx recently from urology regarding dysuria. Since taking the medication she has also noted increase in vaginal discharge. Health Maintenance  Health Maintenance Due   Topic Date Due    Hepatitis C Screening  1955    Lipid Screen  05/03/1994    Shingrix Vaccine Age 50> (1 of 2) 05/03/2004    Breast Cancer Screen Mammogram  09/04/2014    GLAUCOMA SCREENING Q2Y  05/03/2019    Bone Densitometry (Dexa) Screening  05/03/2019    Pneumococcal 65+ years (2 of 2 - PPSV23) 05/03/2019    Medicare Yearly Exam  12/30/2019    Flu Vaccine (1) 09/01/2020     Review of Systems   Review of Systems   Constitutional: Negative for chills and fever. HENT: Negative for congestion, ear discharge and sore throat. Eyes: Negative for double vision, photophobia and discharge. Respiratory: Negative for cough, sputum production, shortness of breath and wheezing. Cardiovascular: Negative for chest pain, palpitations and leg swelling. Gastrointestinal: Negative for abdominal pain, blood in stool, constipation, diarrhea, nausea and vomiting. Genitourinary: Negative for dysuria, frequency and urgency. Musculoskeletal: Positive for joint pain. Negative for falls, myalgias and neck pain. Skin: Negative. Neurological: Negative for dizziness, tremors and headaches. Vitals/Objective:     Vitals:    10/06/20 1033   BP: 127/64   Pulse: 90   Resp: 16   Temp: 97.7 °F (36.5 °C)   TempSrc: Oral   SpO2: 96%   Weight: 200 lb 9.6 oz (91 kg)   Height: 5' 2\" (1.575 m)     Body mass index is 36.69 kg/m². Wt Readings from Last 3 Encounters:   10/06/20 200 lb 9.6 oz (91 kg)   04/20/20 202 lb (91.6 kg)   12/24/19 202 lb (91.6 kg)       Physical Exam  Constitutional:       General: She is not in acute distress. Appearance: Normal appearance. She is well-developed. She is not ill-appearing, toxic-appearing or diaphoretic.    HENT: Head: Normocephalic and atraumatic. Right Ear: External ear normal.      Left Ear: External ear normal.      Mouth/Throat:      Pharynx: No oropharyngeal exudate or posterior oropharyngeal erythema. Eyes:      General:         Right eye: No discharge. Left eye: No discharge. Conjunctiva/sclera: Conjunctivae normal.   Cardiovascular:      Rate and Rhythm: Normal rate and regular rhythm. Heart sounds: S1 normal and S2 normal. No murmur. Pulmonary:      Effort: Pulmonary effort is normal.      Breath sounds: Normal breath sounds. No rales. Musculoskeletal:      Right shoulder: She exhibits decreased range of motion, tenderness, pain and spasm. She exhibits no bony tenderness, no swelling, no effusion, no deformity, no laceration, normal pulse and normal strength. Right lower leg: No edema. Left lower leg: No edema. Comments: Unable to abduction greater then 100 degrees. IR restricted due to pain. ER intact. Skin:     General: Skin is warm and dry. Neurological:      Mental Status: She is alert and oriented to person, place, and time. No results found for this or any previous visit (from the past 24 hour(s)). Disposition     Follow-up and Dispositions  ·   Return in about 2 weeks (around 10/20/2020) for Please schedule jalil for Medicare Wellness ASA. No future appointments. History   Patient's past medical, surgical and family histories were reviewed and updated.     Past Medical History:   Diagnosis Date    DJD (degenerative joint disease)     Osteoarthritis of lumbar spine 12/24/2019    Psoriasis     Recurrent major depressive disorder, in full remission ([facility] [address] 12/24/2019     Past Surgical History:   Procedure Laterality Date    HX MYOMECTOMY      HX WRIST FRACTURE TX       Family History   Problem Relation Age of Onset    Hypertension Mother     Heart Attack Father      Social History     Tobacco Use    Smoking status: Former Smoker    Smokeless tobacco: Former User   Substance Use Topics    Alcohol use:  Yes     Alcohol/week: 2.0 standard drinks     Types: 2 Cans of beer per week     Comment: ocassionally    Drug use: Never       Allergies     Allergies   Allergen Reactions    Gabapentin Palpitations    Procaine Other (comments)     Had a shot in mouth and felt like eye was going to pop out of her head

## 2020-10-06 NOTE — PATIENT INSTRUCTIONS
Vaccine Information Statement    Influenza (Flu) Vaccine (Inactivated or Recombinant): What You Need to Know    Many Vaccine Information Statements are available in [place] and other languages. See www.immunize.org/vis  Hojas de información sobre vacunas están disponibles en español y en muchos otros idiomas. Visite www.immunize.org/vis    1. Why get vaccinated? Influenza vaccine can prevent influenza (flu). Flu is a contagious disease that spreads around the [facility] every year, usually between October and May. Anyone can get the flu, but it is more dangerous for some people. Infants and young children, people 72years of age and older, pregnant women, and people with certain health conditions or a weakened immune system are at greatest risk of flu complications. Pneumonia, bronchitis, sinus infections and ear infections are examples of flu-related complications. If you have a medical condition, such as heart disease, cancer or diabetes, flu can make it worse. Flu can cause fever and chills, sore throat, muscle aches, fatigue, cough, headache, and runny or stuffy nose. Some people may have vomiting and diarrhea, though this is more common in children than adults. Each year thousands of people in the [facility] die from flu, and many more are hospitalized. Flu vaccine prevents millions of illnesses and flu-related visits to the doctor each year. 2. Influenza vaccines     CDC recommends everyone 10months of age and older get vaccinated every flu season. Children 6 months through 6years of age may need 2 doses during a single flu season. Everyone else needs only 1 dose each flu season. It takes about 2 weeks for protection to develop after vaccination. There are many flu viruses, and they are always changing. Each year a new flu vaccine is made to protect against three or four viruses that are likely to cause disease in the upcoming flu season.  Even when the vaccine doesnt exactly match these viruses, it may still provide some protection. Influenza vaccine does not cause flu. Influenza vaccine may be given at the same time as other vaccines. 3. Talk with your health care provider    Tell your vaccine provider if the person getting the vaccine:   Has had an allergic reaction after a previous dose of influenza vaccine, or has any severe, life-threatening allergies.  Has ever had Guillain-Barré Syndrome (also called GBS). In some cases, your health care provider may decide to postpone influenza vaccination to a future visit. People with minor illnesses, such as a cold, may be vaccinated. People who are moderately or severely ill should usually wait until they recover before getting influenza vaccine. Your health care provider can give you more information. 4. Risks of a reaction     Soreness, redness, and swelling where shot is given, fever, muscle aches, and headache can happen after influenza vaccine.  There may be a very small increased risk of Guillain-Barré Syndrome (GBS) after inactivated influenza vaccine (the flu shot). [facility] children who get the flu shot along with pneumococcal vaccine (PCV13), and/or DTaP vaccine at the same time might be slightly more likely to have a seizure caused by fever. Tell your health care provider if a child who is getting flu vaccine has ever had a seizure. People sometimes faint after medical procedures, including vaccination. Tell your provider if you feel dizzy or have vision changes or ringing in the ears. As with any medicine, there is a very remote chance of a vaccine causing a severe allergic reaction, other serious injury, or death. 5. What if there is a serious problem? An allergic reaction could occur after the vaccinated person leaves the clinic.  If you see signs of a severe allergic reaction (hives, swelling of the face and throat, difficulty breathing, a fast heartbeat, dizziness, or weakness), call 9-1-1 and get the person to the nearest hospital.    For other signs that concern you, call your health care provider. Adverse reactions should be reported to the Vaccine Adverse Event Reporting System (VAERS). Your health care provider will usually file this report, or you can do it yourself. Visit the VAERS website at www.vaers. [facility].gov or call 7-441.494.4538. VAERS is only for reporting reactions, and VAERS staff do not give medical advice. 6. The National Vaccine Injury Compensation Program    The [facility] Vaccine Injury Compensation Program (VICP) is a federal program that was created to compensate people who may have been injured by certain vaccines. Visit the VICP website at www.[facility].gov/vaccinecompensation or call 9-103.585.6106 to learn about the program and about filing a claim. There is a time limit to file a claim for compensation. 7. How can I learn more?  Ask your health care provider.  Call your local or state health department.  Contact the Centers for Disease Control and Prevention (CDC):  - Call 5-832.704.6883 (1-800-CDC-INFO) or  - Visit CDCs influenza website at www.cdc.gov/flu    Vaccine Information Statement (Interim)  Inactivated Influenza Vaccine   8/15/2019  42 MELECIO Roberto 113XC-05   Department of Health and Human Services  Centers for Disease Control and Prevention    Office Use Only

## 2020-12-08 ENCOUNTER — TRANSCRIBE ORDER (OUTPATIENT)
Dept: SCHEDULING | Age: 65
End: 2020-12-08

## 2020-12-08 DIAGNOSIS — M75.121 COMPLETE TEAR OF RIGHT ROTATOR CUFF: ICD-10-CM

## 2020-12-08 DIAGNOSIS — M75.52 BURSITIS OF LEFT SHOULDER: Primary | ICD-10-CM

## 2021-01-29 ENCOUNTER — TELEPHONE (OUTPATIENT)
Dept: FAMILY MEDICINE CLINIC | Age: 66
End: 2021-01-29

## 2021-01-29 NOTE — TELEPHONE ENCOUNTER
Please advise       ----- Message from Dmitriy oLaiza sent at 1/29/2021 10:24 AM EST -----  Regarding: Dr. Ginger Torres: [phone]  General Message/Vendor Calls    Caller's first and last name:pt      Reason for call: Information only      Callback required yes/no and why:yes      Best contact number(s):[phone]      Details to clarify the request: Pt is wanting to know if anyone in [facility] that will do Fecal microbiotic treatment during covid.       Dmitriy Loaiza

## 2021-01-29 NOTE — TELEPHONE ENCOUNTER
Called patient and verified ID. Patient stated that she had already called a GI specialist and had found that the procedure is being preformed.

## 2021-02-22 ENCOUNTER — VIRTUAL VISIT (OUTPATIENT)
Dept: FAMILY MEDICINE CLINIC | Age: 66
End: 2021-02-22
Payer: MEDICARE

## 2021-02-22 DIAGNOSIS — B37.9 YEAST INFECTION: Primary | ICD-10-CM

## 2021-02-22 PROCEDURE — G9717 DOC PT DX DEP/BP F/U NT REQ: HCPCS | Performed by: FAMILY MEDICINE

## 2021-02-22 PROCEDURE — G8427 DOCREV CUR MEDS BY ELIG CLIN: HCPCS | Performed by: FAMILY MEDICINE

## 2021-02-22 PROCEDURE — 3288F FALL RISK ASSESSMENT DOCD: CPT | Performed by: FAMILY MEDICINE

## 2021-02-22 PROCEDURE — G8400 PT W/DXA NO RESULTS DOC: HCPCS | Performed by: FAMILY MEDICINE

## 2021-02-22 PROCEDURE — 99213 OFFICE O/P EST LOW 20 MIN: CPT | Performed by: FAMILY MEDICINE

## 2021-02-22 PROCEDURE — 3017F COLORECTAL CA SCREEN DOC REV: CPT | Performed by: FAMILY MEDICINE

## 2021-02-22 PROCEDURE — 1090F PRES/ABSN URINE INCON ASSESS: CPT | Performed by: FAMILY MEDICINE

## 2021-02-22 PROCEDURE — 1100F PTFALLS ASSESS-DOCD GE2>/YR: CPT | Performed by: FAMILY MEDICINE

## 2021-02-22 RX ORDER — FLUCONAZOLE 150 MG/1
150 TABLET ORAL DAILY
Qty: 1 TAB | Refills: 0 | Status: SHIPPED | OUTPATIENT
Start: 2021-02-22 | End: 2021-02-23

## 2021-02-22 NOTE — PROGRESS NOTES
Renee Mathew  65 y.o. female  1955  MRN:[id number]    [facility]  Progress Note     Encounter Date: 2/22/2021    Renee Mathew is a 65 y.o. female who was seen by synchronous (real-time) audio-video technology on 2/22/2021.      She and/or her healthcare decision maker is aware that this patient-initiated Telehealth encounter is a billable service, with coverage as determined by her insurance carrier. She  is aware that she may receive a bill and has provided verbal consent to proceed:Yes    I was at home while conducting this encounter. The patient was at home during the encounter.   This visit was completed virtually using [email or web address]  Assessment and Plan:     Encounter Diagnoses     ICD-10-CM ICD-9-CM   1. Yeast infection  B37.9 112.9       1. Yeast infection  - fluconazole (DIFLUCAN) 150 mg tablet; Take 1 Tab by mouth daily for 1 day. FDA advises cautious prescribing of oral fluconazole in pregnancy.  Dispense: 1 Tab; Refill: 0      We discussed the expected course, resolution and complications of the diagnosis(es) in detail.  Medication risks, benefits, costs, interactions, and alternatives were discussed as indicated.  I advised her to contact the office if her condition worsens, changes or fails to improve as anticipated. She expressed understanding with the diagnosis(es) and plan.     CPT Codes 01506-00983 for Established Patients may apply to this Telehealth Visit    Pursuant to the emergency declaration under the [place]jo Act and the National Emergencies Act, 1135 waiver authority and the Coronavirus Preparedness and Response Supplemental Appropriations Act, this Virtual  Visit was conducted, with patient's consent, to reduce the patient's risk of exposure to COVID-19 and provide continuity of care for an established patient.     Services were provided through a video synchronous discussion virtually to substitute for in-person clinic visit.    Electronically Signed:  Angélica Lovelace MD    Current Medications after this visit     Current Outpatient Medications   Medication Sig    fluconazole (DIFLUCAN) 150 mg tablet Take 1 Tab by mouth daily for 1 day. FDA advises cautious prescribing of oral fluconazole in pregnancy.  guselkumab (TREMFYA SC) by SubCUTAneous route. 1 injection every other month. Prescribed by Dermatology.  cranberry extract 450 mg tab tablet Take 450 mg by mouth.  methocarbamoL (ROBAXIN) 750 mg tablet Take 1 Tab by mouth three (3) times daily.  cyclobenzaprine (AMRIX) 15 mg cp24 SR capsule Take 1 Cap by mouth daily as needed for Muscle Spasm(s).  pantoprazole (PROTONIX) 40 mg tablet Take 1 Tab by mouth daily.  buPROPion SR (WELLBUTRIN SR) 150 mg SR tablet Take 1 Tab by mouth two (2) times a day.  betamethasone dipropionate (DIPROLENE) 0.05 % ointment APPLY OINTMENT TOPICALLY TWICE DAILY TO KNEES. ALTERNATE WITH CALCIPOTRIENE    Lactobacillus acidophilus (PROBIOTIC PO) Take  by mouth.  cetirizine (ZYRTEC) 10 mg tablet Take  by mouth.  simethicone (GAS RELIEF PO) Take  by mouth. No current facility-administered medications for this visit. There are no discontinued medications. ~~~~~~~~~~~~~~~~~~~~~~~~~~~~~~~~~~~~~~~~~~~~~~    Chief Complaint   Patient presents with    Yeast Infection       History of Present Illness   Sekou Vilchis is a 76 y.o. female who presents for:    Yeast infection  Patient present with cc of yeast infection x 1.5 weeks. Patient reports urinary odor which is similar prior episodes of yeast infection. . Denies dysuria, hematuria, nausea, or abdominal pain. Review of Systems   Review of Systems   Constitutional: Negative for chills, fever and weight loss. Gastrointestinal: Negative for abdominal pain, nausea and vomiting. Genitourinary: Negative for dysuria, flank pain, frequency, hematuria and urgency. Neurological: Negative for dizziness and headaches.         Vitals/Objective:     Due to this being a TeleHealth evaluation, many elements of the physical examination are unable to be assessed. Physical Exam  Constitutional:       General: She is not in acute distress. Appearance: Normal appearance. She is obese. She is not ill-appearing. HENT:      Head: Normocephalic and atraumatic. Right Ear: External ear normal.      Left Ear: External ear normal.      Mouth/Throat:      Mouth: Mucous membranes are moist.   Eyes:      General:         Right eye: No discharge. Left eye: No discharge. Extraocular Movements: Extraocular movements intact. Conjunctiva/sclera: Conjunctivae normal.   Neck:      Musculoskeletal: Normal range of motion. Pulmonary:      Effort: Pulmonary effort is normal.      Comments: No visualized signs of difficulty breathing or respiratory distress  Skin:     Coloration: Skin is not pale. Findings: No erythema or rash. Neurological:      Mental Status: She is alert and oriented to person, place, and time. Cranial Nerves: No cranial nerve deficit ( No Facial Asymmetry (Cranial nerve 7 motor function) (limited exam due to video visit) ). Comments: Able to follow commands    Psychiatric:         Mood and Affect: Mood normal.         Behavior: Behavior normal.          No results found for this or any previous visit (from the past 24 hour(s)). Disposition     Future Appointments   Date Time Provider Alyce Valencia   2/26/2021 11:20 AM Josh Mckeon MD [facility]       History   Patient's past medical, surgical and family histories were reviewed and updated.     Past Medical History:   Diagnosis Date    DJD (degenerative joint disease)     Osteoarthritis of lumbar spine 12/24/2019    Psoriasis     Recurrent major depressive disorder, in full remission ([facility] [address] 12/24/2019     Past Surgical History:   Procedure Laterality Date    HX MYOMECTOMY      HX WRIST FRACTURE [place]       Family History   Problem Relation Age of Onset    Hypertension Mother     Heart Attack Father      Social History     Tobacco Use    Smoking status: Former Smoker    Smokeless tobacco: Former User   Substance Use Topics    Alcohol use:  Yes     Alcohol/week: 2.0 standard drinks     Types: 2 Cans of beer per week     Comment: ocassionally    Drug use: Never       Allergies     Allergies   Allergen Reactions    Gabapentin Palpitations    Procaine Other (comments)     Had a shot in mouth and felt like eye was going to pop out of her head

## 2021-02-26 ENCOUNTER — OFFICE VISIT (OUTPATIENT)
Dept: FAMILY MEDICINE CLINIC | Age: 66
End: 2021-02-26
Payer: MEDICARE

## 2021-02-26 VITALS
HEART RATE: 76 BPM | OXYGEN SATURATION: 98 % | HEIGHT: 62 IN | BODY MASS INDEX: 38.46 KG/M2 | SYSTOLIC BLOOD PRESSURE: 111 MMHG | WEIGHT: 209 LBS | TEMPERATURE: 97.9 F | DIASTOLIC BLOOD PRESSURE: 76 MMHG | RESPIRATION RATE: 16 BRPM

## 2021-02-26 DIAGNOSIS — Z13.220 SCREENING FOR LIPID DISORDERS: ICD-10-CM

## 2021-02-26 DIAGNOSIS — M47.816 OSTEOARTHRITIS OF LUMBAR SPINE, UNSPECIFIED SPINAL OSTEOARTHRITIS COMPLICATION STATUS: ICD-10-CM

## 2021-02-26 DIAGNOSIS — F33.42 RECURRENT MAJOR DEPRESSIVE DISORDER, IN FULL REMISSION (HCC): ICD-10-CM

## 2021-02-26 DIAGNOSIS — Z78.0 POST-MENOPAUSAL: ICD-10-CM

## 2021-02-26 DIAGNOSIS — Z51.81 ENCOUNTER FOR MONITORING CHRONIC NSAID THERAPY: ICD-10-CM

## 2021-02-26 DIAGNOSIS — Z00.00 MEDICARE ANNUAL WELLNESS VISIT, INITIAL: Primary | ICD-10-CM

## 2021-02-26 DIAGNOSIS — Z53.20 SCREENING MAMMOGRAPHY DECLINED: ICD-10-CM

## 2021-02-26 DIAGNOSIS — Z79.1 ENCOUNTER FOR MONITORING CHRONIC NSAID THERAPY: ICD-10-CM

## 2021-02-26 LAB
ANION GAP SERPL CALC-SCNC: 4 MMOL/L (ref 5–15)
BUN SERPL-MCNC: 15 MG/DL (ref 6–20)
BUN/CREAT SERPL: 15 (ref 12–20)
CALCIUM SERPL-MCNC: 9.5 MG/DL (ref 8.5–10.1)
CHLORIDE SERPL-SCNC: 106 MMOL/L (ref 97–108)
CHOLEST SERPL-MCNC: 231 MG/DL
CO2 SERPL-SCNC: 29 MMOL/L (ref 21–32)
CREAT SERPL-MCNC: 0.97 MG/DL (ref 0.55–1.02)
GLUCOSE SERPL-MCNC: 91 MG/DL (ref 65–100)
HDLC SERPL-MCNC: 71 MG/DL
HDLC SERPL: 3.3 {RATIO} (ref 0–5)
LDLC SERPL CALC-MCNC: 126.6 MG/DL (ref 0–100)
LIPID PROFILE,FLP: ABNORMAL
POTASSIUM SERPL-SCNC: 4.5 MMOL/L (ref 3.5–5.1)
SODIUM SERPL-SCNC: 139 MMOL/L (ref 136–145)
TRIGL SERPL-MCNC: 167 MG/DL (ref ?–150)
TSH SERPL DL<=0.05 MIU/L-ACNC: 2.85 UIU/ML (ref 0.36–3.74)
VLDLC SERPL CALC-MCNC: 33.4 MG/DL

## 2021-02-26 PROCEDURE — G8417 CALC BMI ABV UP PARAM F/U: HCPCS | Performed by: FAMILY MEDICINE

## 2021-02-26 PROCEDURE — 3017F COLORECTAL CA SCREEN DOC REV: CPT | Performed by: FAMILY MEDICINE

## 2021-02-26 PROCEDURE — G8427 DOCREV CUR MEDS BY ELIG CLIN: HCPCS | Performed by: FAMILY MEDICINE

## 2021-02-26 PROCEDURE — G8400 PT W/DXA NO RESULTS DOC: HCPCS | Performed by: FAMILY MEDICINE

## 2021-02-26 PROCEDURE — G8536 NO DOC ELDER MAL SCRN: HCPCS | Performed by: FAMILY MEDICINE

## 2021-02-26 PROCEDURE — 3288F FALL RISK ASSESSMENT DOCD: CPT | Performed by: FAMILY MEDICINE

## 2021-02-26 PROCEDURE — 1100F PTFALLS ASSESS-DOCD GE2>/YR: CPT | Performed by: FAMILY MEDICINE

## 2021-02-26 PROCEDURE — G0438 PPPS, INITIAL VISIT: HCPCS | Performed by: FAMILY MEDICINE

## 2021-02-26 PROCEDURE — G9717 DOC PT DX DEP/BP F/U NT REQ: HCPCS | Performed by: FAMILY MEDICINE

## 2021-02-26 RX ORDER — BUPROPION HYDROCHLORIDE 150 MG/1
150 TABLET, EXTENDED RELEASE ORAL 2 TIMES DAILY
Qty: 180 TAB | Refills: 0 | Status: SHIPPED | OUTPATIENT
Start: 2021-02-26 | End: 2021-07-21 | Stop reason: SDUPTHER

## 2021-02-26 RX ORDER — CYCLOBENZAPRINE HYDROCHLORIDE 15 MG/1
15 CAPSULE, EXTENDED RELEASE ORAL
Qty: 30 CAP | Refills: 2 | Status: SHIPPED | OUTPATIENT
Start: 2021-02-26 | End: 2021-07-21

## 2021-02-26 RX ORDER — PANTOPRAZOLE SODIUM 40 MG/1
40 TABLET, DELAYED RELEASE ORAL 2 TIMES DAILY
Qty: 180 TAB | Refills: 1 | Status: SHIPPED | OUTPATIENT
Start: 2021-02-26 | End: 2021-10-18

## 2021-02-26 NOTE — PROGRESS NOTES
Identified pt with two pt identifiers(name and ). Reviewed record in preparation for visit and have obtained necessary documentation. Chief Complaint   Patient presents with    Annual Wellness Visit        Health Maintenance Due   Topic    Hepatitis C Screening     COVID-19 Vaccine (1 of 2)    Lipid Screen     Shingrix Vaccine Age 50> (1 of 2)    Breast Cancer Screen Mammogram     GLAUCOMA SCREENING Q2Y     Bone Densitometry (Dexa) Screening     Pneumococcal 65+ years (2 of 2 - PPSV23)    Medicare Yearly Exam        Coordination of Care Questionnaire:  :   1) Have you been to an emergency room, urgent care, or hospitalized since your last visit? If yes, where when, and reason for visit? no      2. Have seen or consulted any other health care provider since your last visit? If yes, where when, and reason for visit?  no        Patient is accompanied by self I have received verbal consent from Heriberto Bajwa to discuss any/all medical information while they are present in the room.

## 2021-02-26 NOTE — PATIENT INSTRUCTIONS
Learning About the Low FODMAP Diet for Irritable Bowel Syndrome (IBS)  What is the low-FODMAP diet? A low-FODMAP diet is a way to find out what foods give you digestion problems. You stop eating certain high-FODMAP foods for about 2 months. Then you add them back to see how your body reacts. This is called a \"challenge diet. \" A dietitian or doctor can help you follow this diet. FODMAPs are carbohydrates. They are in many types of foods. FODMAP stands for:  · F ermentable. · O ligosaccharides. · D isaccharides. · M onosaccharides. · A nd p olyols. If you have digestive problems, some of these foods can make your symptoms worse. When you are on this diet, you can still eat certain fruits and vegetables. You can also eat certain grains, meats, fish, and lactose-free milks. What is it used for? If you have irritable bowel syndrome (IBS), you can ease your symptoms by not eating some types of foods. Some people also use this diet for inflammatory bowel disease (IBD) or some food intolerances. High-FODMAP foods can be hard to digest. They pull more fluid into your intestines. They are also easily fermented. This can lead to bloating, belly pain, gas, and diarrhea. The low-FODMAP diet can help you figure out what foods to avoid. And it can help you find foods that are easier to digest.  This diet can help with symptoms of some digestive diseases. But it's not a cure. You will still need to manage your condition. How does it work? You will work with a doctor or dietitian when you start the diet. At first, you won't eat any high-FODMAP foods for a few weeks. Go to www.[employer]. CURRENT to learn more about this diet. Delmar Snell also find links to an jalil for your phone or other device. You'll find low-FODMAP cookbooks there too. After 6 to 8 weeks, you will start to try high-FODMAP foods again. You will add those foods back to your diet, one group at a time.  Your doctor or dietitian will probably have you wait a few days before you add each new group of those foods. Keep a food diary. You can write down the foods you try and note how they make you feel. After a few weeks, you may have a better idea of what foods you should avoid and what foods make you feel your best.  What are the risks? There is some risk of not getting all of the vitamins and nutrients you need on the low-FODMAP diet. These include:  · Folate. · Thiamin. · Vitamin B6.  · Calcium. · Vitamin D. Your dietitian or doctor can help you find other sources of these if needed. This diet may limit your fiber intake. Try to plan your meals to include other sources of fiber. What foods are on the low-FODMAP diet? Here is a guide to foods that you can eat, plus the foods that you should avoid, when you are on the low-FODMAP diet. Grains  Okay to eat: Foods made from grains like arrowroot, buckwheat, corn, millet, and oats. You can also eat potato, quinoa, rice, sorghum, tapioca, and teff. Cereals, pasta, breads, corn tortillas and baked goods made from these grains are also okay. (These grains may be labeled \"gluten-free. \")  Avoid: Grains like wheat, barley, and rye. Avoid ingredients such as bulgur, couscous, durum, and semolina. And avoid cereals, breads, and pastas made from these grains. Avoid chickpea, lentil, and pea flour. Proteins  Okay to eat: Most meat, fish, and eggs without high-FODMAP sauces. You can have small amounts of almonds or hazelnuts (10 nuts). Macadamia nuts, peanuts, pecans, pine nuts, and walnuts are also okay. You can also eat ronak and pumpkin seeds, tofu, and tempeh. Avoid: Beans, chickpeas, lentils, and soybeans. Avoid pistachio and cashew nuts. And some sausages may have high-FODMAP ingredients. Dairy  Okay to eat: Lactose-free dairy milks. Rice milk and almond milk are okay. So are lactose-free yogurts, kefirs, ice creams, and sorbet from low-FODMAP fruits and sweeteners. (These are often labeled \"lactose-free. \") You can have small amounts (2 Tbsp) of cottage, cream, or ricotta cheese. Hard cheeses like cheddar, [place], ROSS, and Swiss are okay. So are small amounts (1 oz) of aged or ripened cheeses like Brie, blue, and feta. Avoid: Milk, including cow, goat, and sheep. Avoid condensed or evaporated milk, buttermilk, custard, cream, sour cream, yogurt, and ice cream. Avoid soy milk. (Check sauces for dairy ingredients.)  Vegetables  Okay to eat: Bamboo shoots, bell peppers, bok shaq, up to ½ cup of broccoli or cabbage (red or white), and cucumbers. Eggplant, green beans, lettuce, olives, parsnips, and potatoes are okay to eat. So are pumpkin, rutabaga, seaweed, sprouts, Swiss chard, and spinach. You can eat scallions (green part only) and squash (not butternut). You can eat tomatoes, turnips, watercress, yams, and zucchini. You can also have small amounts of artichoke hearts (from can, 1 oz), carrots, corn (½ cob), and sweet potato (½ cup). Avoid: Artichokes, asparagus, [place] sprouts, boone cabbage, cauliflower, and celery. And avoid garlic, leeks, mushrooms, okra, onions, scallions (white part), shallots, and peas. Fruits  Okay to eat: Bananas, blueberries, cantaloupe, coconut, grapes, and honeydew. Kiwi, eileen, limes, oranges, passion fruit, papaya, and pineapple are also okay. You can eat plantain, raspberries, rhubarb, star fruit, strawberries, tangelo, and tangerine. You can also have small amounts of dried banana chips (up to 10 chips), dried cranberries (1 Tbsp), and shredded coconut (up to ¼ cup). Avoid: Apples, applesauce, apricots, avocados, blackberries, boysenberries, and cherries. Also avoid dates, figs, grapefruit, guava, lychee, and mangoes. Don't eat nectarines, peaches, pears, persimmon, plums, prunes, tamarillo, or watermelon. And limit most canned and dried fruits.   Oils, spices, condiments, and sweeteners  Okay to eat: Vegetable oils (including garlic infused), butter, ghee, lard, and margarine (no trans fat). You can have most fresh herbs like basil, chives, coriander, marisela, parsley, rosemary and thyme. You can have salt, jams made from low-FODMAP fruits, mayonnaise, and mustard. Soy sauce, hot sauce (no garlic), tamari, and vinegar are also okay. Sweeteners that are okay include sugar (sucrose), powdered (confectioner's) sugar, brown sugar, glucose, and maple syrup. You can also have some artificial sweeteners like aspartame, saccharine, and stevia. Avoid: Chutneys, hummus, jellies, garlic sauces, and gravies made with onion or garlic. Avoid pickles, relish, some salad dressings and soup stocks, salsa, and tomato paste. And avoid sauces and other foods with high fructose corn syrup, honey, molasses, and agave. Avoid artificial sweeteners (isomalt, mannitol, malitol, sorbitol, and xylitol). Avoid corn syrup solids, fructose, fruit juice concentrate, and polydextrose. Other foods and drinks  Okay to have: Water, soda water, tonic, soft drinks sweetened with sugar, ½ cup of low-FODMAP fruit juice, and most teas and alcohols. You can also eat foods made with baking powder and soda, cocoa, and gelatin. Avoid: Juices from high-FODMAP fruits and vegetables. And avoid fortified melissa, chamomile and fennel teas, chicory-based drinks and coffee substitutes, and bouillon cubes. Follow-up care is a key part of your treatment and safety. Be sure to make and go to all appointments, and call your doctor if you are having problems. It's also a good idea to know your test results and keep a list of the medicines you take. Where can you learn more? Go to http://www.gray.com/  Enter L235 in the search box to learn more about \"Learning About the Low FODMAP Diet for Irritable Bowel Syndrome (IBS). \"  Current as of: August 22, 2019               Content Version: 12.6  © 0529-1497 [employer].    Care instructions adapted under license by [employer] (which disclaims liability or warranty for this information). If you have questions about a medical condition or this instruction, always ask your healthcare professional. [place] [address] any warranty or liability for your use of this information.

## 2021-02-26 NOTE — PROGRESS NOTES
This is an Initial Medicare Annual Wellness Exam (AWV) (Performed 12 months after IPPE or effective date of Medicare Part B enrollment, Once in a lifetime)    I have reviewed the patient's medical history in detail and updated the computerized patient record. History     Past Medical History:   Diagnosis Date    DJD (degenerative joint disease)     Osteoarthritis of lumbar spine 12/24/2019    Psoriasis     Recurrent major depressive disorder, in full remission ([facility] [address] 12/24/2019      Past Surgical History:   Procedure Laterality Date    HX MYOMECTOMY      HX WRIST FRACTURE TX       Allergies   Allergen Reactions    Gabapentin Palpitations    Procaine Other (comments)     Had a shot in mouth and felt like eye was going to pop out of her head     Family History   Problem Relation Age of Onset    Hypertension Mother     Heart Attack Father     Other Sister         anuersym    Hypertension Sister     Other Brother         brain tumor    Breast Cancer Maternal Aunt      Social History     Tobacco Use    Smoking status: Former Smoker    Smokeless tobacco: Former User   Substance Use Topics    Alcohol use: Yes     Alcohol/week: 2.0 standard drinks     Types: 2 Cans of beer per week     Comment: ocassionally     Depression Risk Factor Screening:     3 most recent PHQ Screens 2/26/2021   Little interest or pleasure in doing things Not at all   Feeling down, depressed, irritable, or hopeless Not at all   Total Score PHQ 2 0     Alcohol Risk Factor Screening:   No flowsheet data found. Functional Ability and Level of Safety:   Hearing Loss  Hearing is good.     Activities of Daily Living  ADL Assessment 2/26/2021   Feeding yourself No Help Needed   Getting from bed to chair No Help Needed   Getting dressed No Help Needed   Bathing or showering No Help Needed   Walk across the room (includes cane/walker) No Help Needed   Using the telphone No Help Needed   Taking your medications No Help Needed   Preparing meals No Help Needed   Managing money (expenses/bills) No Help Needed   Moderately strenuous housework (laundry) Help Needed   Shopping for personal items (toiletries/medicines) No Help Needed   Shopping for groceries Help Needed   Driving No Help Needed   Climbing a flight of stairs No Help Needed   Getting to places beyond walking distances Help Needed       Fall Risk  Fall Risk Assessment, last 12 mths 2/26/2021   Able to walk? Yes   Fall in past 12 months? 0   Do you feel unsteady? 0   Are you worried about falling 0   Number of falls in past 12 months -   Fall with injury? -       Abuse Screen  Abuse Screening Questionnaire 2/26/2021   Do you ever feel afraid of your partner? N   Are you in a relationship with someone who physically or mentally threatens you? N   Is it safe for you to go home? Y     Cognitive Screening   Evaluation of Cognitive Function:  Has your family/caregiver stated any concerns about your memory: no    No flowsheet data found. Patient Care Team   Patient Care Team:  Garo Lainez MD as PCP - General (Family Medicine)  Garo Lainez MD as PCP - [facility] Empaneled Provider  Assessment/Plan   Education and counseling provided:  Are appropriate based on today's review and evaluation  End-of-Life planning (with patient's consent)  Screening Mammography  Screening for glaucoma    Copy of [facility] ACD given to patient and reviewed. Diagnoses and all orders for this visit:    1. Medicare annual wellness visit, initial  -     METABOLIC PANEL, BASIC; Future  -     TSH 3RD GENERATION; Future  -     LIPID PANEL; Future    2. Screening mammography declined    3. Post-menopausal  -     DEXA BONE DENSITY STUDY AXIAL; Future    4. Screening for lipid disorders  -     LIPID PANEL; Future    5. Encounter for monitoring chronic NSAID therapy  -     pantoprazole (PROTONIX) 40 mg tablet; Take 1 Tab by mouth two (2) times a day.     6. Recurrent major depressive disorder, in full remission ([facility] [address]  - buPROPion SR (WELLBUTRIN SR) 150 mg SR tablet; Take 1 Tab by mouth two (2) times a day. 7. Osteoarthritis of lumbar spine, unspecified spinal osteoarthritis complication status  -     cyclobenzaprine (AMRIX) 15 mg cp24 SR capsule; Take 1 Cap by mouth daily as needed for Muscle Spasm(s).          Health Maintenance Topics with due status: Overdue       Topic Date Due    Hepatitis C Screening 1955    COVID-19 Vaccine 05/03/1970    Lipid Screen 05/03/1994    Shingrix Vaccine Age 50> 05/03/2004    Breast Cancer Screen Mammogram 09/04/2014    GLAUCOMA SCREENING Q2Y 05/03/2019    Bone Densitometry (Dexa) Screening 05/03/2019    Pneumococcal 65+ years 05/03/2019    Medicare Yearly Exam 12/30/2019     Health Maintenance Topics with due status: Not Due       Topic Last Completion Date    DTaP/Tdap/Td series 11/21/2014    Colorectal Cancer Screening Combo 06/29/2017     Health Maintenance Topics with due status: Completed       Topic Last Completion Date    Flu Vaccine 10/06/2020

## 2021-03-09 ENCOUNTER — TELEPHONE (OUTPATIENT)
Dept: FAMILY MEDICINE CLINIC | Age: 66
End: 2021-03-09

## 2021-03-09 NOTE — TELEPHONE ENCOUNTER
----- Message from Abby Perez sent at 3/8/2021  4:22 PM EST -----  Regarding: Dr. Shirley Finney Message/Vendor Calls    Caller's first and last name: N/A      Reason for call: To discuss lab work taken around March 2nd.        Callback required yes/no and why: Yes, to discuss       Best contact number(s): [phone]      Details to clarify the request: N/A      Abby Perez

## 2021-04-21 ENCOUNTER — TRANSCRIBE ORDER (OUTPATIENT)
Dept: SCHEDULING | Age: 66
End: 2021-04-21

## 2021-04-21 DIAGNOSIS — M75.121 NONTRAUMATIC COMPLETE TEAR OF RIGHT ROTATOR CUFF: Primary | ICD-10-CM

## 2021-04-29 ENCOUNTER — HOSPITAL ENCOUNTER (OUTPATIENT)
Dept: MRI IMAGING | Age: 66
Discharge: HOME | End: 2021-04-29
Attending: ORTHOPAEDIC SURGERY
Payer: MEDICARE

## 2021-04-29 DIAGNOSIS — M75.121 NONTRAUMATIC COMPLETE TEAR OF RIGHT ROTATOR CUFF: ICD-10-CM

## 2021-04-29 PROCEDURE — 73221 MRI JOINT UPR EXTREM W/O DYE: CPT

## 2021-07-19 ENCOUNTER — TELEPHONE (OUTPATIENT)
Dept: FAMILY MEDICINE CLINIC | Age: 66
End: 2021-07-19

## 2021-07-19 NOTE — TELEPHONE ENCOUNTER
Requesting Ibuprofen 800MG tablets  QTY 90  SIG take 1 tablet by mouth every 8 hours as needed for pain

## 2021-07-21 ENCOUNTER — OFFICE VISIT (OUTPATIENT)
Dept: FAMILY MEDICINE CLINIC | Age: 66
End: 2021-07-21
Payer: MEDICARE

## 2021-07-21 VITALS
HEIGHT: 62 IN | HEART RATE: 80 BPM | TEMPERATURE: 97.8 F | WEIGHT: 211.4 LBS | SYSTOLIC BLOOD PRESSURE: 124 MMHG | RESPIRATION RATE: 16 BRPM | DIASTOLIC BLOOD PRESSURE: 78 MMHG | OXYGEN SATURATION: 98 % | BODY MASS INDEX: 38.9 KG/M2

## 2021-07-21 DIAGNOSIS — R73.01 ABNORMAL FASTING GLUCOSE: ICD-10-CM

## 2021-07-21 DIAGNOSIS — E78.2 MIXED HYPERLIPIDEMIA: ICD-10-CM

## 2021-07-21 DIAGNOSIS — Z12.31 ENCOUNTER FOR SCREENING MAMMOGRAM FOR MALIGNANT NEOPLASM OF BREAST: ICD-10-CM

## 2021-07-21 DIAGNOSIS — F33.42 RECURRENT MAJOR DEPRESSIVE DISORDER, IN FULL REMISSION (HCC): ICD-10-CM

## 2021-07-21 DIAGNOSIS — M47.816 OSTEOARTHRITIS OF LUMBAR SPINE, UNSPECIFIED SPINAL OSTEOARTHRITIS COMPLICATION STATUS: ICD-10-CM

## 2021-07-21 DIAGNOSIS — K21.00 GASTROESOPHAGEAL REFLUX DISEASE WITH ESOPHAGITIS WITHOUT HEMORRHAGE: Primary | ICD-10-CM

## 2021-07-21 DIAGNOSIS — Z74.09 IMPAIRED MOBILITY: ICD-10-CM

## 2021-07-21 PROCEDURE — 99214 OFFICE O/P EST MOD 30 MIN: CPT | Performed by: FAMILY MEDICINE

## 2021-07-21 RX ORDER — BUPROPION HYDROCHLORIDE 150 MG/1
150 TABLET, EXTENDED RELEASE ORAL 2 TIMES DAILY
Qty: 180 TABLET | Refills: 0 | Status: SHIPPED | OUTPATIENT
Start: 2021-07-21 | End: 2021-10-18

## 2021-07-21 RX ORDER — IBUPROFEN 800 MG/1
800 TABLET ORAL
Qty: 30 TABLET | Refills: 0 | Status: SHIPPED | OUTPATIENT
Start: 2021-07-21 | End: 2022-06-28

## 2021-07-21 RX ORDER — ALBUTEROL SULFATE 90 UG/1
AEROSOL, METERED RESPIRATORY (INHALATION)
COMMUNITY
Start: 2020-09-03 | End: 2022-07-25 | Stop reason: SDUPTHER

## 2021-07-21 RX ORDER — DICYCLOMINE HYDROCHLORIDE 10 MG/1
CAPSULE ORAL
COMMUNITY
Start: 2021-07-07 | End: 2022-08-22 | Stop reason: SDUPTHER

## 2021-07-21 RX ORDER — FAMOTIDINE 40 MG/1
40 TABLET, FILM COATED ORAL DAILY
Qty: 90 TABLET | Refills: 5 | Status: SHIPPED | OUTPATIENT
Start: 2021-07-21 | End: 2022-10-10 | Stop reason: SDUPTHER

## 2021-07-21 NOTE — PROGRESS NOTES
Gissell Campbell is a 78 y.o. female , established patient, here for evaluation of the following chief complaint(s):    HPI  Chief Complaint   Patient presents with    Medication Refill     All medications    Labs     Requesting labs       1. Gastroesophageal reflux disease with esophagitis without hemorrhage  Patient reports acid reflux symptoms. She is currently taking Protonix twice a day. Her gastroenterologist is Dr. Cheyenne Back. I have advised her to make appointment with Dr. Cheyenne Back for further evaluation. I have also discussed with patient that she should not take NSAIDs if she is having acid reflux symptoms. I will not give refills of Motrin 800 until patient has been evaluated by GI. 2. Recurrent major depressive disorder, in full remission [facility])  Patient takes Wellbutrin for depression symptoms. Currently well controlled with Wellbutrin. Denies suicidal or homicidal ideation. Requests refills. 3. Encounter for screening mammogram for malignant neoplasm of breast  Not up-to-date on mammogram    4. Mixed hyperlipidemia  HLD  Patient presents today for hyperlipidemia. Patient currently taking not taking any cholesterol medication. Lab Results   Component Value Date/Time    Cholesterol, total 231 (H) 02/26/2021 12:18 PM    HDL Cholesterol 71 02/26/2021 12:18 PM    LDL, calculated 126.6 (H) 02/26/2021 12:18 PM    VLDL, calculated 33.4 02/26/2021 12:18 PM    Triglyceride 167 (H) 02/26/2021 12:18 PM    CHOL/HDL Ratio 3.3 02/26/2021 12:18 PM       5. Abnormal fasting glucose  Reports her blood sugars were elevated at dermatologist office lab work. Requests recheck. 6. Osteoarthritis of lumbar spine, unspecified spinal osteoarthritis complication status  Complains of back pain. Has been taking over-the-counter NSAIDs. Requests prescription for Motrin 800. I discussed with her that due to her symptoms and her physical she should not take NSAIDs frequently.   I will not refill Motrin 800 until patient is evaluated by GI. Orthopedic doctor: Dr. Kourtney Sky. S/p spine surgery. 7. Impaired mobility  Patient complains of back pain and shoulder pain. Reports she has to stop every 20 feet to take rest.  She has tingling in the left leg and left foot. Reports she has to use a shopping cart to walk from parking lot to the store. Does not use cane or walker. Patient had spinal surgery done by Dr. Kourtney Sky in August 2020. Review of Systems   Constitutional: Negative. HENT: Negative. Eyes: Negative. Respiratory: Negative. Cardiovascular: Negative. Gastrointestinal: Positive for heartburn. Genitourinary: Negative. Musculoskeletal: Positive for back pain. Skin: Negative. Neurological: Negative. Endo/Heme/Allergies: Negative. Psychiatric/Behavioral: Negative. Physical Exam  Vitals and nursing note reviewed. HENT:      Head: Normocephalic and atraumatic. Right Ear: External ear normal.      Left Ear: External ear normal.      Nose: Nose normal.   Eyes:      Conjunctiva/sclera: Conjunctivae normal.   Cardiovascular:      Rate and Rhythm: Normal rate and regular rhythm. Pulmonary:      Effort: Pulmonary effort is normal.      Breath sounds: Normal breath sounds. Abdominal:      General: Bowel sounds are normal. There is no distension. Palpations: Abdomen is soft. Tenderness: There is no abdominal tenderness. Musculoskeletal:         General: Normal range of motion. Cervical back: Normal range of motion and neck supple. Skin:     General: Skin is warm and dry. Neurological:      Mental Status: She is alert.        /78 (BP 1 Location: Right arm, BP Patient Position: Sitting, BP Cuff Size: Adult)   Pulse 80   Temp 97.8 °F (36.6 °C) (Temporal)   Resp 16   Ht 5' 2\" (1.575 m)   Wt 211 lb 6.4 oz (95.9 kg)   SpO2 98%   BMI 38.67 kg/m²     Allergies   Allergen Reactions    Penicillins Shortness of Breath     Unknown reaction, but remembered she had one    Gabapentin Palpitations    Procaine Other (comments)     Had a shot in mouth and felt like eye was going to pop out of her head       Current Outpatient Medications   Medication Sig    dicyclomine (BENTYL) 10 mg capsule TAKE 1 CAPSULE BY MOUTH EVERY 6 HOURS AS NEEDED    albuterol (PROVENTIL HFA, VENTOLIN HFA, PROAIR HFA) 90 mcg/actuation inhaler     famotidine (PEPCID) 40 mg tablet Take 1 Tablet by mouth daily.  buPROPion SR (WELLBUTRIN SR) 150 mg SR tablet Take 1 Tablet by mouth two (2) times a day.  ibuprofen (MOTRIN) 800 mg tablet Take 1 Tablet by mouth daily as needed for Pain.  pantoprazole (PROTONIX) 40 mg tablet Take 1 Tab by mouth two (2) times a day.  guselkumab (TREMFYA SC) by SubCUTAneous route. 1 injection every other month. Prescribed by Dermatology.  Lactobacillus acidophilus (PROBIOTIC PO) Take  by mouth.  cetirizine (ZYRTEC) 10 mg tablet Take  by mouth.  simethicone (GAS RELIEF PO) Take  by mouth. No current facility-administered medications for this visit. Past Medical History:   Diagnosis Date    DJD (degenerative joint disease)     Osteoarthritis of lumbar spine 12/24/2019    Psoriasis     Recurrent major depressive disorder, in full remission ([facility] [address] 12/24/2019       Past Surgical History:   Procedure Laterality Date   Jocelyn Every WRIST FRACTURE TX         Problem List  Date Reviewed: 7/21/2021        Codes Class Noted    Chronic bronchitis ([facility] [address] ICD-10-CM: J42  ICD-9-CM: 491.9  1/8/2020        Dysthymic ICD-10-CM: F34.1  ICD-9-CM: 300.4  12/24/2019        Psoriasis ICD-10-CM: L40.9  ICD-9-CM: 696.1  12/24/2019        Osteoarthritis of lumbar spine ICD-10-CM: M47.816  ICD-9-CM: 721.3  12/24/2019               No results found for this visit on 07/21/21.      1. Gastroesophageal reflux disease with esophagitis without hemorrhage    - REFERRAL TO GASTROENTEROLOGY  - famotidine (PEPCID) 40 mg tablet;  Take 1 Tablet by mouth daily.  Dispense: 90 Tablet; Refill: 5  - CBC WITH AUTOMATED DIFF; Future  - CBC WITH AUTOMATED DIFF    2. Recurrent major depressive disorder, in full remission ([facility] [address]    - buPROPion SR (WELLBUTRIN SR) 150 mg SR tablet; Take 1 Tablet by mouth two (2) times a day. Dispense: 180 Tablet; Refill: 0  - CBC WITH AUTOMATED DIFF; Future  - CBC WITH AUTOMATED DIFF    3. Encounter for screening mammogram for malignant neoplasm of breast    - [facility] MAMMO BI SCREENING INCL CAD; Future    4. Mixed hyperlipidemia    - THYROID CASCADE PROFILE; Future  - METABOLIC PANEL, COMPREHENSIVE; Future  - LIPID PANEL; Future  - THYROID CASCADE PROFILE  - METABOLIC PANEL, COMPREHENSIVE  - LIPID PANEL    5. Abnormal fasting glucose    - THYROID CASCADE PROFILE; Future  - HEMOGLOBIN A1C WITH EAG; Future  - URINALYSIS W/ REFLEX CULTURE; Future  - MICROALBUMIN, UR, RAND W/ MICROALB/CREAT RATIO; Future  - METABOLIC PANEL, COMPREHENSIVE; Future  - THYROID CASCADE PROFILE  - HEMOGLOBIN A1C WITH EAG  - URINALYSIS W/ REFLEX CULTURE  - MICROALBUMIN, UR, RAND W/ MICROALB/CREAT RATIO  - METABOLIC PANEL, COMPREHENSIVE    6. Osteoarthritis of lumbar spine, unspecified spinal osteoarthritis complication status    - ibuprofen (MOTRIN) 800 mg tablet; Take 1 Tablet by mouth daily as needed for Pain. Dispense: 30 Tablet; Refill: 0    7. Impaired mobility  Patient will drop off her DMV form to be filled out        Follow-up and Dispositions    · Return in about 2 weeks (around 8/4/2021) for follow up, discuss labs. I ADVISED PATIENT TO GO TO ER IF SYMPTOMS WORSEN , CHANGE OR FAILS TO IMPROVE. I have discussed the diagnosis with the patient and the intended plan as seen in the above orders. The patient has received an after-visit summary and questions were answered concerning future plans. I have discussed medication side effects and warnings with the patient as well. The patient agrees and understands above plan.            Freda Evans, MD

## 2021-07-21 NOTE — PROGRESS NOTES
Patient stated name &     Chief Complaint   Patient presents with    Medication Refill     All medications    Labs     Requesting labs        Health Maintenance Due   Topic    Hepatitis C Screening     COVID-19 Vaccine (1)    Shingrix Vaccine Age 49> (1 of 2)    Breast Cancer Screen Mammogram     Bone Densitometry (Dexa) Screening     Pneumococcal 65+ years (2 of 2 - PPSV23)       Wt Readings from Last 3 Encounters:   21 211 lb 6.4 oz (95.9 kg)   21 209 lb (94.8 kg)   10/06/20 200 lb 9.6 oz (91 kg)     Temp Readings from Last 3 Encounters:   21 97.8 °F (36.6 °C) (Temporal)   21 97.9 °F (36.6 °C) (Temporal)   10/06/20 97.7 °F (36.5 °C) (Oral)     BP Readings from Last 3 Encounters:   21 124/78   21 111/76   10/06/20 127/64     Pulse Readings from Last 3 Encounters:   21 80   21 76   10/06/20 90         Learning Assessment:  :     No flowsheet data found. Depression Screening:  :     3 most recent PHQ Screens 2021   Little interest or pleasure in doing things Not at all   Feeling down, depressed, irritable, or hopeless Not at all   Total Score PHQ 2 0       Fall Risk Assessment:  :     Fall Risk Assessment, last 12 mths 2021   Able to walk? Yes   Fall in past 12 months? 0   Do you feel unsteady? 0   Are you worried about falling 0   Number of falls in past 12 months -   Fall with injury? -       Abuse Screening:  :     Abuse Screening Questionnaire 2021   Do you ever feel afraid of your partner? N   Are you in a relationship with someone who physically or mentally threatens you? N   Is it safe for you to go home? Y       Coordination of Care Questionnaire:  :     1) Have you been to an emergency room, urgent care clinic since your last visit? No    Hospitalized since your last visit? No             2) Have you seen or consulted any other health care providers outside of [address] [place] [address] since your last visit?  No  (Include any pap smears or colon screenings in this section.)    Patient is accompanied by self I have received verbal consent from Desmond Mace to discuss any/all medical information while they are present in the room.

## 2021-07-22 ENCOUNTER — TELEPHONE (OUTPATIENT)
Dept: FAMILY MEDICINE CLINIC | Age: 66
End: 2021-07-22

## 2021-07-22 LAB
ALBUMIN SERPL-MCNC: 4.4 G/DL (ref 3.8–4.8)
ALBUMIN/CREAT UR: <4 MG/G CREAT (ref 0–29)
ALBUMIN/GLOB SERPL: 1.7 {RATIO} (ref 1.2–2.2)
ALP SERPL-CCNC: 59 IU/L (ref 48–121)
ALT SERPL-CCNC: 18 IU/L (ref 0–32)
APPEARANCE UR: CLEAR
AST SERPL-CCNC: 22 IU/L (ref 0–40)
BACTERIA #/AREA URNS HPF: NORMAL /[HPF]
BASOPHILS # BLD AUTO: 0.1 X10E3/UL (ref 0–0.2)
BASOPHILS NFR BLD AUTO: 1 %
BILIRUB SERPL-MCNC: 0.3 MG/DL (ref 0–1.2)
BILIRUB UR QL STRIP: NEGATIVE
BUN SERPL-MCNC: 22 MG/DL (ref 8–27)
BUN/CREAT SERPL: 24 (ref 12–28)
CALCIUM SERPL-MCNC: 9.8 MG/DL (ref 8.7–10.3)
CASTS URNS QL MICRO: NORMAL /LPF
CHLORIDE SERPL-SCNC: 104 MMOL/L (ref 96–106)
CHOLEST SERPL-MCNC: 228 MG/DL (ref 100–199)
CO2 SERPL-SCNC: 23 MMOL/L (ref 20–29)
COLOR UR: YELLOW
CREAT SERPL-MCNC: 0.9 MG/DL (ref 0.57–1)
CREAT UR-MCNC: 79.5 MG/DL
EOSINOPHIL # BLD AUTO: 0.7 X10E3/UL (ref 0–0.4)
EOSINOPHIL NFR BLD AUTO: 9 %
EPI CELLS #/AREA URNS HPF: NORMAL /HPF (ref 0–10)
ERYTHROCYTE [DISTWIDTH] IN BLOOD BY AUTOMATED COUNT: 13.2 % (ref 11.7–15.4)
EST. AVERAGE GLUCOSE BLD GHB EST-MCNC: 126 MG/DL
GLOBULIN SER CALC-MCNC: 2.6 G/DL (ref 1.5–4.5)
GLUCOSE SERPL-MCNC: 99 MG/DL (ref 65–99)
GLUCOSE UR QL: NEGATIVE
HBA1C MFR BLD: 6 % (ref 4.8–5.6)
HCT VFR BLD AUTO: 39.4 % (ref 34–46.6)
HDLC SERPL-MCNC: 61 MG/DL
HGB BLD-MCNC: 13 G/DL (ref 11.1–15.9)
HGB UR QL STRIP: NEGATIVE
IMM GRANULOCYTES # BLD AUTO: 0 X10E3/UL (ref 0–0.1)
IMM GRANULOCYTES NFR BLD AUTO: 0 %
KETONES UR QL STRIP: NEGATIVE
LDLC SERPL CALC-MCNC: 145 MG/DL (ref 0–99)
LEUKOCYTE ESTERASE UR QL STRIP: NEGATIVE
LYMPHOCYTES # BLD AUTO: 2.4 X10E3/UL (ref 0.7–3.1)
LYMPHOCYTES NFR BLD AUTO: 29 %
MCH RBC QN AUTO: 29.4 PG (ref 26.6–33)
MCHC RBC AUTO-ENTMCNC: 33 G/DL (ref 31.5–35.7)
MCV RBC AUTO: 89 FL (ref 79–97)
MICRO URNS: NORMAL
MICRO URNS: NORMAL
MICROALBUMIN UR-MCNC: <3 UG/ML
MONOCYTES # BLD AUTO: 0.4 X10E3/UL (ref 0.1–0.9)
MONOCYTES NFR BLD AUTO: 6 %
NEUTROPHILS # BLD AUTO: 4.4 X10E3/UL (ref 1.4–7)
NEUTROPHILS NFR BLD AUTO: 55 %
NITRITE UR QL STRIP: NEGATIVE
PH UR STRIP: 6 [PH] (ref 5–7.5)
PLATELET # BLD AUTO: 293 X10E3/UL (ref 150–450)
POTASSIUM SERPL-SCNC: 4.7 MMOL/L (ref 3.5–5.2)
PROT SERPL-MCNC: 7 G/DL (ref 6–8.5)
PROT UR QL STRIP: NEGATIVE
RBC # BLD AUTO: 4.42 X10E6/UL (ref 3.77–5.28)
RBC #/AREA URNS HPF: NORMAL /HPF (ref 0–2)
SODIUM SERPL-SCNC: 142 MMOL/L (ref 134–144)
SP GR UR: 1.02 (ref 1–1.03)
T4 FREE SERPL-MCNC: 0.75 NG/DL (ref 0.82–1.77)
TRIGL SERPL-MCNC: 122 MG/DL (ref 0–149)
TSH SERPL DL<=0.005 MIU/L-ACNC: 5.82 UIU/ML (ref 0.45–4.5)
URINALYSIS REFLEX, 377202: NORMAL
UROBILINOGEN UR STRIP-MCNC: 0.2 MG/DL (ref 0.2–1)
VLDLC SERPL CALC-MCNC: 22 MG/DL (ref 5–40)
WBC # BLD AUTO: 8 X10E3/UL (ref 3.4–10.8)
WBC #/AREA URNS HPF: NORMAL /HPF (ref 0–5)

## 2021-07-23 ENCOUNTER — TELEPHONE (OUTPATIENT)
Dept: FAMILY MEDICINE CLINIC | Age: 66
End: 2021-07-23

## 2021-07-23 NOTE — PROGRESS NOTES
Please inform patient, thyroid labs are abnormal.  Patient to make appointment to discuss starting thyroid medication.   Please schedule thanks

## 2021-07-23 NOTE — PROGRESS NOTES
Please call back she needs to come in to discuss everything in detail, I remember she has a lot of questions.   Please schedule thanks

## 2021-07-29 ENCOUNTER — VIRTUAL VISIT (OUTPATIENT)
Dept: FAMILY MEDICINE CLINIC | Age: 66
End: 2021-07-29
Payer: MEDICARE

## 2021-07-29 DIAGNOSIS — E78.2 MIXED HYPERLIPIDEMIA: ICD-10-CM

## 2021-07-29 DIAGNOSIS — E03.9 HYPOTHYROIDISM, UNSPECIFIED TYPE: Primary | ICD-10-CM

## 2021-07-29 PROCEDURE — 99213 OFFICE O/P EST LOW 20 MIN: CPT | Performed by: FAMILY MEDICINE

## 2021-07-29 RX ORDER — LEVOTHYROXINE SODIUM 50 UG/1
50 TABLET ORAL
Qty: 90 TABLET | Refills: 3 | Status: SHIPPED | OUTPATIENT
Start: 2021-07-29 | End: 2022-06-29 | Stop reason: SDUPTHER

## 2021-07-29 NOTE — PROGRESS NOTES
Consent: Holley Marte, who was seen by synchronous (real-time) audio-video technology, and/or her healthcare decision maker, is aware that this patient-initiated, Telehealth encounter on 7/29/2021 is a billable service, with coverage as determined by her insurance carrier. She is aware that she may receive a bill and has provided verbal consent to proceed: Yes. Assessment & Plan:   Diagnoses and all orders for this visit:    1. Hypothyroidism, unspecified type  -     levothyroxine (SYNTHROID) 50 mcg tablet; Take 1 Tablet by mouth Daily (before breakfast). -     THYROID CASCADE PROFILE; Future    2. Mixed hyperlipidemia    Declines starting medication today. Follow-up and Dispositions    · Return in about 6 weeks (around 9/9/2021) for follow up, med check, discuss labs. I ADVISED PATIENT TO GO TO ER IF SYMPTOMS WORSEN , CHANGE OR FAILS TO IMPROVE. I spent at least 15 minutes with this established patient, and >50% of the time was spent counseling and/or coordinating care regarding SEE BELOW  712  Subjective:   Holley Marte is a 78 y.o. 1955 female  established patient, who was seen for Labs          1. Hypothyroidism, unspecified type  Patient is currently not on any thyroid medication. She reports symptoms of fatigue and brain fog. I will start medication. Last TSH reviewed:   Lab Results   Component Value Date/Time    TSH 5.820 (H) 07/21/2021 12:00 AM    TSH 2.85 02/26/2021 12:18 PM         2. Mixed hyperlipidemia    Patient presents today for hyperlipidemia. Patient is currently not on any cholesterol medication. We discussed recent lab results in detail. Patient would like to try lifestyle modification prior to starting medication. She will start low-fat diet and exercise regimen. We will recheck labs in 3 months.     Lab Results   Component Value Date/Time    Cholesterol, total 228 (H) 07/21/2021 12:00 AM    HDL Cholesterol 61 07/21/2021 12:00 AM    LDL, calculated 145 (H) 07/21/2021 12:00 AM    LDL, calculated 126.6 (H) 02/26/2021 12:18 PM    VLDL, calculated 22 07/21/2021 12:00 AM    VLDL, calculated 33.4 02/26/2021 12:18 PM    Triglyceride 122 07/21/2021 12:00 AM    CHOL/HDL Ratio 3.3 02/26/2021 12:18 PM          Prior to Admission medications    Medication Sig Start Date End Date Taking? Authorizing Provider   levothyroxine (SYNTHROID) 50 mcg tablet Take 1 Tablet by mouth Daily (before breakfast). 7/29/21  Yes Nadya Long MD   dicyclomine (BENTYL) 10 mg capsule TAKE 1 CAPSULE BY MOUTH EVERY 6 HOURS AS NEEDED 7/7/21   Provider, Historical   albuterol (PROVENTIL HFA, VENTOLIN HFA, PROAIR HFA) 90 mcg/actuation inhaler  9/3/20   Provider, Historical   famotidine (PEPCID) 40 mg tablet Take 1 Tablet by mouth daily. 7/21/21   Nadya Long MD   buPROPion SR (WELLBUTRIN SR) 150 mg SR tablet Take 1 Tablet by mouth two (2) times a day. 7/21/21   Nadya Long MD   ibuprofen (MOTRIN) 800 mg tablet Take 1 Tablet by mouth daily as needed for Pain. 7/21/21   Nadya Long MD   pantoprazole (PROTONIX) 40 mg tablet Take 1 Tab by mouth two (2) times a day. 2/26/21   Moises Mckeon MD   guselkumab [facility]) by SubCUTAneous route. 1 injection every other month. Prescribed by Dermatology. Provider, Historical   Lactobacillus acidophilus (PROBIOTIC PO) Take  by mouth. Provider, Historical   cetirizine (ZYRTEC) 10 mg tablet Take  by mouth. Provider, Historical   simethicone (GAS RELIEF PO) Take  by mouth.     Provider, Historical     Allergies   Allergen Reactions    Penicillins Shortness of Breath     Unknown reaction, but remembered she had one    Gabapentin Palpitations    Procaine Other (comments)     Had a shot in mouth and felt like eye was going to pop out of her head       Patient Active Problem List   Diagnosis Code    Dysthymic F34.1    Psoriasis L40.9    Osteoarthritis of lumbar spine M47.816    Chronic bronchitis ([address] J42     Patient Active Problem List Diagnosis Date Noted    Chronic bronchitis ([facility] [address] 01/08/2020    Dysthymic 12/24/2019    Psoriasis 12/24/2019    Osteoarthritis of lumbar spine 12/24/2019     Current Outpatient Medications   Medication Sig Dispense Refill    levothyroxine (SYNTHROID) 50 mcg tablet Take 1 Tablet by mouth Daily (before breakfast). 90 Tablet 3    dicyclomine (BENTYL) 10 mg capsule TAKE 1 CAPSULE BY MOUTH EVERY 6 HOURS AS NEEDED      albuterol (PROVENTIL HFA, VENTOLIN HFA, PROAIR HFA) 90 mcg/actuation inhaler       famotidine (PEPCID) 40 mg tablet Take 1 Tablet by mouth daily. 90 Tablet 5    buPROPion SR (WELLBUTRIN SR) 150 mg SR tablet Take 1 Tablet by mouth two (2) times a day. 180 Tablet 0    ibuprofen (MOTRIN) 800 mg tablet Take 1 Tablet by mouth daily as needed for Pain. 30 Tablet 0    pantoprazole (PROTONIX) 40 mg tablet Take 1 Tab by mouth two (2) times a day. 180 Tab 1    guselkumab (TREMFYA SC) by SubCUTAneous route. 1 injection every other month. Prescribed by Dermatology.  Lactobacillus acidophilus (PROBIOTIC PO) Take  by mouth.  cetirizine (ZYRTEC) 10 mg tablet Take  by mouth.  simethicone (GAS RELIEF PO) Take  by mouth.        Allergies   Allergen Reactions    Penicillins Shortness of Breath     Unknown reaction, but remembered she had one    Gabapentin Palpitations    Procaine Other (comments)     Had a shot in mouth and felt like eye was going to pop out of her head     Past Medical History:   Diagnosis Date    DJD (degenerative joint disease)     Osteoarthritis of lumbar spine 12/24/2019    Psoriasis     Recurrent major depressive disorder, in full remission ([facility] [address] 12/24/2019     Past Surgical History:   Procedure Laterality Date    HX MYOMECTOMY      HX WRIST FRACTURE TX       Family History   Problem Relation Age of Onset    Hypertension Mother     Heart Attack Father     Other Sister         anuersym    Hypertension Sister     Other Brother         brain tumor    Breast Cancer Maternal Aunt      Social History     Tobacco Use    Smoking status: Former Smoker    Smokeless tobacco: Former User   Substance Use Topics    Alcohol use: Yes     Alcohol/week: 2.0 standard drinks     Types: 2 Cans of beer per week     Comment: ocassionally       Review of Systems   Constitutional: Positive for malaise/fatigue. HENT: Negative. Eyes: Negative. Respiratory: Negative. Cardiovascular: Negative. Gastrointestinal: Negative. Genitourinary: Negative. Musculoskeletal: Negative. Skin: Negative. Neurological: Negative. Endo/Heme/Allergies: Negative. Psychiatric/Behavioral: Negative.             Objective:     Patient-Reported Vitals 6/30/2020   Patient-Reported Weight 202lb   Patient-Reported Height 5ft2in        [INSTRUCTIONS:  \"[x]\" Indicates a positive item  \"[]\" Indicates a negative item  -- DELETE ALL ITEMS NOT EXAMINED]    Constitutional: [x] Appears well-developed and well-nourished [x] No apparent distress      [] Abnormal -     Mental status: [x] Alert and awake  [x] Oriented to person/place/time [x] Able to follow commands    [] Abnormal -     Eyes:   EOM    [x]  Normal    [] Abnormal -   Sclera  [x]  Normal    [] Abnormal -          Discharge [x]  None visible   [] Abnormal -     HENT: [x] Normocephalic, atraumatic  [] Abnormal -   [x] Mouth/Throat: Mucous membranes are moist    External Ears [x] Normal  [] Abnormal -    Neck: [x] No visualized mass [] Abnormal -     Pulmonary/Chest: [x] Respiratory effort normal   [x] No visualized signs of difficulty breathing or respiratory distress        [] Abnormal -      Musculoskeletal:   [x] Normal gait with no signs of ataxia         [x] Normal range of motion of neck        [] Abnormal -     Neurological:        [x] No Facial Asymmetry (Cranial nerve 7 motor function) (limited exam due to video visit)          [x] No gaze palsy        [] Abnormal -          Skin:        [x] No significant exanthematous lesions or discoloration noted on facial skin         [] Abnormal -            Psychiatric:       [x] Normal Affect [] Abnormal -        [x] No Hallucinations    Other pertinent observable physical exam findings:-    We discussed the expected course, resolution and complications of the diagnosis(es) in detail. Medication risks, benefits, costs, interactions, and alternatives were discussed as indicated. I advised her to contact the office if her condition worsens, changes or fails to improve as anticipated. She expressed understanding with the diagnosis(es) and plan. On this date 07/29/2021 I have spent 25-30 minutes reviewing previous notes, test results and face to face with the patient discussing the diagnosis and importance of compliance with the treatment plan as well as documenting on the day of the visit. Rashel Grullon is a 78 y.o. female  is being evaluated by a Virtual Visit (video visit) encounter to address concerns as mentioned above. A caregiver was present when appropriate. Due to this being a TeleHealth encounter (During KNIZF-61 public health emergency), evaluation of the following organ systems was limited: Vitals/Constitutional/EENT/Resp/CV/GI//MS/Neuro/Skin/Heme-Lymph-Imm. Pursuant to the emergency declaration under the [facility], [address] [place] [address] authority and the [email or web address] and [employer] Act, this Virtual Visit was conducted with patient's (and/or legal guardian's) consent, to reduce the patient's risk of exposure to COVID-19 and provide necessary medical care. The patient (and/or legal guardian) has also been advised to contact this office for worsening conditions or problems, and seek emergency medical treatment and/or call 911 if deemed necessary.     Patient identification was verified at the start of the visit: YES    Services were provided through a video synchronous discussion virtually to substitute for in-person clinic visit. Patient and provider were located at their individual homes. An electronic signature was used to authenticate this note.       Sergey King MD

## 2021-08-09 ENCOUNTER — TELEPHONE (OUTPATIENT)
Dept: FAMILY MEDICINE CLINIC | Age: 66
End: 2021-08-09

## 2021-08-09 NOTE — TELEPHONE ENCOUNTER
----- Message from [place] [employer] sent at 8/9/2021  4:36 PM EDT -----  Regarding: Dr. Afsaneh Carranza Message/Vendor Calls    Caller's first and last name: N/A      Reason for call: Speak to the nurse/ medical advice. Callback required yes/no and why: yes      Best contact number(s): [phone]      Details to clarify the request: Pt would like to discuss something personal with the nurse regarding H. Pylori.       [facility]

## 2021-08-11 ENCOUNTER — TELEPHONE (OUTPATIENT)
Dept: FAMILY MEDICINE CLINIC | Age: 66
End: 2021-08-11

## 2021-08-11 NOTE — TELEPHONE ENCOUNTER
----- Message from Lionel Apgar sent at 2021  4:54 PM EDT -----  Regarding: /Telephone  Referral    Caller (first and last name if not the patient or from practice):N/A      Caller's relationship to patient (if not from a practice):N/A      Name of caller (if calling from a practice):N/A      Name of practice:[facility], [address] [place] [address].: [facility]      Specialist's title, first, and last name: Dr. Edison Loo      Fax number: [phone]      Date and time of appointment:21 10:30 am      Reason for appointment: H Pylori & stomach ulcers       Details to clarify the request: Pt is upset that she did not receive a call back from the office regarding this matter      Lionel Apgar

## 2021-08-12 ENCOUNTER — HOSPITAL ENCOUNTER (OUTPATIENT)
Dept: MAMMOGRAPHY | Age: 66
Discharge: HOME | End: 2021-08-12
Attending: FAMILY MEDICINE
Payer: MEDICARE

## 2021-08-12 ENCOUNTER — TELEPHONE (OUTPATIENT)
Dept: FAMILY MEDICINE CLINIC | Age: 66
End: 2021-08-12

## 2021-08-12 DIAGNOSIS — Z12.31 ENCOUNTER FOR SCREENING MAMMOGRAM FOR MALIGNANT NEOPLASM OF BREAST: ICD-10-CM

## 2021-08-12 PROCEDURE — 77067 SCR MAMMO BI INCL CAD: CPT

## 2021-10-16 DIAGNOSIS — Z79.1 ENCOUNTER FOR MONITORING CHRONIC NSAID THERAPY: ICD-10-CM

## 2021-10-16 DIAGNOSIS — F33.42 RECURRENT MAJOR DEPRESSIVE DISORDER, IN FULL REMISSION (HCC): ICD-10-CM

## 2021-10-16 DIAGNOSIS — Z51.81 ENCOUNTER FOR MONITORING CHRONIC NSAID THERAPY: ICD-10-CM

## 2021-10-18 RX ORDER — BUPROPION HYDROCHLORIDE 150 MG/1
TABLET, EXTENDED RELEASE ORAL
Qty: 180 TABLET | Refills: 0 | Status: SHIPPED | OUTPATIENT
Start: 2021-10-18 | End: 2022-01-13 | Stop reason: SDUPTHER

## 2021-10-18 RX ORDER — PANTOPRAZOLE SODIUM 40 MG/1
TABLET, DELAYED RELEASE ORAL
Qty: 180 TABLET | Refills: 1 | Status: SHIPPED | OUTPATIENT
Start: 2021-10-18

## 2021-11-30 ENCOUNTER — TELEPHONE (OUTPATIENT)
Dept: FAMILY MEDICINE CLINIC | Age: 66
End: 2021-11-30

## 2021-12-01 NOTE — TELEPHONE ENCOUNTER
Harlie Heimlich,   Ms. Mathew will get her Tremfya on the 15th.   Wondering when she can get her Pneumonia & flu shot and can she get these togeter?  kasie

## 2021-12-14 ENCOUNTER — TELEPHONE (OUTPATIENT)
Dept: FAMILY MEDICINE CLINIC | Age: 66
End: 2021-12-14

## 2021-12-14 NOTE — TELEPHONE ENCOUNTER
Pt was urged to go to urgent care         ----- Message from Modti Yumi sent at 12/14/2021  8:43 AM EST -----  Subject: Appointment Request    Reason for Call: Urgent (Patient Request) No Script    QUESTIONS  Type of Appointment? Established Patient  Reason for appointment request? No appointments available during search  Additional Information for Provider? pt would like a antibiotic for a toe   infection she has. Pt says that two of her toe nails have came off. Pt   also says that she will need a medication just incase she catches a yeast   infection while on the anti biotic pharmacy ? [facility] [address]   ---------------------------------------------------------------------------  --------------  BUYSTAND  What is the best way for the office to contact you? OK to leave message on   voicemail  Preferred Call Back Phone Number? [phone]  ---------------------------------------------------------------------------  --------------  SCRIPT ANSWERS  Relationship to Patient? Self  (Is the patient requesting to see the provider for a procedure?)? No  (Is the patient requesting to see the provider urgently - today or   tomorrow. )? Yes  Have you been diagnosed with, awaiting test results for, or told that you   are suspected of having COVID-19 (Coronavirus)? (If patient has tested   negative or was tested as a requirement for work, school, or travel and   not based on symptoms, answer no)? No  Within the past two weeks have you developed any of the following symptoms   (answer no if symptoms have been present longer than 2 weeks or began   more than 2 weeks ago)? Fever or Chills, Cough, Shortness of breath or   difficulty breathing, Loss of taste or smell, Sore throat, Nasal   congestion, Sneezing or runny nose, Fatigue or generalized body aches   (answer no if pain is specific to a body part e.g. back pain), Diarrhea,   Headache? No  Have you had close contact with someone with COVID-19 in the last 14 days? No  (Service Expert - click yes below to proceed with [employer] As Usual   Scheduling)?  Yes

## 2022-01-06 ENCOUNTER — TELEPHONE (OUTPATIENT)
Dept: FAMILY MEDICINE CLINIC | Age: 67
End: 2022-01-06

## 2022-01-06 NOTE — TELEPHONE ENCOUNTER
----- Message from Jose Cruzes sent at 2022  1:15 PM EST -----  Subject: Message to Provider    QUESTIONS  Information for Provider? pt called in too state her handicap sticker is a   bout to  she would like the Dr to send another form so she can get   it renewed please call her to advise  ---------------------------------------------------------------------------  --------------  CALL BACK INFO  What is the best way for the office to contact you? OK to leave message on   voicemail  Preferred Call Back Phone Number? [phone]  ---------------------------------------------------------------------------  --------------  SCRIPT ANSWERS  Relationship to Patient?  Self

## 2022-01-13 DIAGNOSIS — F33.42 RECURRENT MAJOR DEPRESSIVE DISORDER, IN FULL REMISSION (HCC): ICD-10-CM

## 2022-01-13 NOTE — TELEPHONE ENCOUNTER
PCP: Fatoumata Goddard MD    Last appt: 7/29/2021  Future Appointments   Date Time Provider Alyce Valencia   1/20/2022 10:00 AM Erica Dumont NP [facility]       Requested Prescriptions     Pending Prescriptions Disp Refills    buPROPion SR (WELLBUTRIN SR) 150 mg SR tablet 180 Tablet 0     Sig: Take 1 Tablet by mouth two (2) times a day.        Prior labs and Blood pressures:  BP Readings from Last 3 Encounters:   07/21/21 124/78   02/26/21 111/76   10/06/20 127/64     Lab Results   Component Value Date/Time    Sodium 142 07/21/2021 12:00 AM    Potassium 4.7 07/21/2021 12:00 AM    Chloride 104 07/21/2021 12:00 AM    CO2 23 07/21/2021 12:00 AM    Anion gap 4 (L) 02/26/2021 12:18 PM    Glucose 99 07/21/2021 12:00 AM    BUN 22 07/21/2021 12:00 AM    Creatinine 0.90 07/21/2021 12:00 AM    BUN/Creatinine ratio 24 07/21/2021 12:00 AM    GFR est AA 77 07/21/2021 12:00 AM    GFR est non-AA 66 07/21/2021 12:00 AM    Calcium 9.8 07/21/2021 12:00 AM     Lab Results   Component Value Date/Time    Hemoglobin A1c 6.0 (H) 07/21/2021 12:00 AM     Lab Results   Component Value Date/Time    Cholesterol, total 228 (H) 07/21/2021 12:00 AM    HDL Cholesterol 61 07/21/2021 12:00 AM    LDL, calculated 145 (H) 07/21/2021 12:00 AM    LDL, calculated 126.6 (H) 02/26/2021 12:18 PM    VLDL, calculated 22 07/21/2021 12:00 AM    VLDL, calculated 33.4 02/26/2021 12:18 PM    Triglyceride 122 07/21/2021 12:00 AM    CHOL/HDL Ratio 3.3 02/26/2021 12:18 PM     No results found for: USHA [place]    Lab Results   Component Value Date/Time    TSH 5.820 (H) 07/21/2021 12:00 AM    TSH 2.85 02/26/2021 12:18 PM

## 2022-01-17 RX ORDER — BUPROPION HYDROCHLORIDE 150 MG/1
150 TABLET, EXTENDED RELEASE ORAL 2 TIMES DAILY
Qty: 180 TABLET | Refills: 0 | Status: SHIPPED | OUTPATIENT
Start: 2022-01-17 | End: 2022-04-14

## 2022-01-20 ENCOUNTER — TELEPHONE (OUTPATIENT)
Dept: FAMILY MEDICINE CLINIC | Age: 67
End: 2022-01-20

## 2022-01-20 ENCOUNTER — VIRTUAL VISIT (OUTPATIENT)
Dept: FAMILY MEDICINE CLINIC | Age: 67
End: 2022-01-20
Payer: MEDICARE

## 2022-01-20 DIAGNOSIS — M47.816 OSTEOARTHRITIS OF LUMBAR SPINE, UNSPECIFIED SPINAL OSTEOARTHRITIS COMPLICATION STATUS: Primary | ICD-10-CM

## 2022-01-20 DIAGNOSIS — Z98.890 S/P LUMBAR SPINE OPERATION: ICD-10-CM

## 2022-01-20 PROCEDURE — 3017F COLORECTAL CA SCREEN DOC REV: CPT | Performed by: NURSE PRACTITIONER

## 2022-01-20 PROCEDURE — 1101F PT FALLS ASSESS-DOCD LE1/YR: CPT | Performed by: NURSE PRACTITIONER

## 2022-01-20 PROCEDURE — G9899 SCRN MAM PERF RSLTS DOC: HCPCS | Performed by: NURSE PRACTITIONER

## 2022-01-20 PROCEDURE — G8427 DOCREV CUR MEDS BY ELIG CLIN: HCPCS | Performed by: NURSE PRACTITIONER

## 2022-01-20 PROCEDURE — G8400 PT W/DXA NO RESULTS DOC: HCPCS | Performed by: NURSE PRACTITIONER

## 2022-01-20 PROCEDURE — 99213 OFFICE O/P EST LOW 20 MIN: CPT | Performed by: NURSE PRACTITIONER

## 2022-01-20 PROCEDURE — G9717 DOC PT DX DEP/BP F/U NT REQ: HCPCS | Performed by: NURSE PRACTITIONER

## 2022-01-20 PROCEDURE — 1090F PRES/ABSN URINE INCON ASSESS: CPT | Performed by: NURSE PRACTITIONER

## 2022-01-20 NOTE — TELEPHONE ENCOUNTER
Requesting update on handicap decal      ----- Message from Margaret Dennis sent at 1/20/2022 11:25 AM EST -----  Subject: Message to Provider    QUESTIONS  Information for Provider? pls cl reg handicap decal  ---------------------------------------------------------------------------  --------------  CALL BACK INFO  What is the best way for the office to contact you? OK to leave message on   voicemail  Preferred Call Back Phone Number? [phone]  ---------------------------------------------------------------------------  --------------  SCRIPT ANSWERS  Relationship to Patient?  Self

## 2022-01-20 NOTE — PROGRESS NOTES
Phi Moran is a 78 y.o. female who was seen by synchronous (real-time) audio-video technology on 1/20/2022 for No chief complaint on file. Assessment & Plan:   Diagnoses and all orders for this visit:    1. Osteoarthritis of lumbar spine, unspecified spinal osteoarthritis complication status   -worsening, advised to follow up with orthopedic. Will complete DMV form today. Patient to  form. Next DMV form will need to be filled out with Orthopedic    2. S/P lumbar spine operation        I spent at least 7 minutes on this visit with this established patient. Subjective:   VV today for update DMV placard. Had spinal surgery done about a year ago and still having problems. Had 4 screws and pins put into spine. Slipped disc. Has had problems with pain. Still slow getting up and down and unable to walk long distances without stopping. Does not use a walking device but uses boyfriends arm to hold to. Prior to Admission medications    Medication Sig Start Date End Date Taking? Authorizing Provider   pantoprazole (PROTONIX) 40 mg tablet TAKE 1 TABLET BY MOUTH TWICE DAILY 10/18/21   Fatoumata Goddard MD   buPROPion SR (WELLBUTRIN SR) 150 mg SR tablet Take 1 Tablet by mouth two (2) times a day. 1/17/22   Fatoumata Goddard MD   levothyroxine (SYNTHROID) 50 mcg tablet Take 1 Tablet by mouth Daily (before breakfast). 7/29/21   Fatoumata Goddard MD   dicyclomine (BENTYL) 10 mg capsule TAKE 1 CAPSULE BY MOUTH EVERY 6 HOURS AS NEEDED 7/7/21   Provider, Historical   albuterol (PROVENTIL HFA, VENTOLIN HFA, PROAIR HFA) 90 mcg/actuation inhaler  9/3/20   Provider, Historical   famotidine (PEPCID) 40 mg tablet Take 1 Tablet by mouth daily. 7/21/21   Fatoumata Goddard MD   ibuprofen (MOTRIN) 800 mg tablet Take 1 Tablet by mouth daily as needed for Pain. 7/21/21   Fatoumata Goddard MD   guselkumab (TREMFYA SC) by SubCUTAneous route. 1 injection every other month. Prescribed by Dermatology.     Provider, Historical Lactobacillus acidophilus (PROBIOTIC PO) Take  by mouth. Provider, Historical   cetirizine (ZYRTEC) 10 mg tablet Take  by mouth. Provider, Historical   simethicone (GAS RELIEF PO) Take  by mouth. Provider, Historical     Patient Active Problem List   Diagnosis Code    Dysthymic F34.1    Psoriasis L40.9    Osteoarthritis of lumbar spine M47.816    Chronic bronchitis ([facility] [address] J42       Review of Systems   Constitutional: Negative for chills, fever and malaise/fatigue. Eyes: Negative for blurred vision. Respiratory: Negative for cough and shortness of breath. Cardiovascular: Negative for chest pain, palpitations and leg swelling. Musculoskeletal: Positive for back pain. Neurological: Negative for dizziness and headaches.        Objective:     Patient-Reported Vitals 6/30/2020   Patient-Reported Weight 202lb   Patient-Reported Height 5ft2in        [INSTRUCTIONS:  \"[x]\" Indicates a positive item  \"[]\" Indicates a negative item  -- DELETE ALL ITEMS NOT EXAMINED]    Constitutional: [x] Appears well-developed and well-nourished [x] No apparent distress      [] Abnormal -     Mental status: [x] Alert and awake  [x] Oriented to person/place/time [x] Able to follow commands    [] Abnormal -     Eyes:   EOM    [x]  Normal    [] Abnormal -   Sclera  [x]  Normal    [] Abnormal -          Discharge [x]  None visible   [] Abnormal -     HENT: [x] Normocephalic, atraumatic  [] Abnormal -   [x] Mouth/Throat: Mucous membranes are moist    External Ears [x] Normal  [] Abnormal -    Neck: [x] No visualized mass [] Abnormal -     Pulmonary/Chest: [x] Respiratory effort normal   [x] No visualized signs of difficulty breathing or respiratory distress        [] Abnormal -      Musculoskeletal:   [x] Normal gait with no signs of ataxia         [x] Normal range of motion of neck        [] Abnormal -     Neurological:        [x] No Facial Asymmetry (Cranial nerve 7 motor function) (limited exam due to video visit) [x] No gaze palsy        [] Abnormal -          Skin:        [x] No significant exanthematous lesions or discoloration noted on facial skin         [] Abnormal -            Psychiatric:       [x] Normal Affect [] Abnormal -        [x] No Hallucinations    Other pertinent observable physical exam findings:-        We discussed the expected course, resolution and complications of the diagnosis(es) in detail. Medication risks, benefits, costs, interactions, and alternatives were discussed as indicated. I advised her to contact the office if her condition worsens, changes or fails to improve as anticipated. She expressed understanding with the diagnosis(es) and plan. Timo Prosper, was evaluated through a synchronous (real-time) audio-video encounter. The patient (or guardian if applicable) is aware that this is a billable service, which includes applicable co-pays. Verbal consent to proceed has been obtained. The visit was conducted pursuant to the emergency declaration under the [address] [place] [address] authority and the [employer] and [employer] Act. Patient identification was verified, and a caregiver was present when appropriate. The patient was located at home in a state where the provider was licensed to provide care.       Padmini Pimentel NP

## 2022-02-22 ENCOUNTER — OFFICE VISIT (OUTPATIENT)
Dept: FAMILY MEDICINE CLINIC | Age: 67
End: 2022-02-22
Payer: MEDICARE

## 2022-02-22 VITALS
HEART RATE: 100 BPM | TEMPERATURE: 96.8 F | OXYGEN SATURATION: 97 % | DIASTOLIC BLOOD PRESSURE: 70 MMHG | RESPIRATION RATE: 16 BRPM | SYSTOLIC BLOOD PRESSURE: 105 MMHG | HEIGHT: 62 IN | WEIGHT: 212 LBS | BODY MASS INDEX: 39.01 KG/M2

## 2022-02-22 DIAGNOSIS — N89.8 VAGINAL ODOR: Primary | ICD-10-CM

## 2022-02-22 DIAGNOSIS — J32.9 SINUSITIS, UNSPECIFIED CHRONICITY, UNSPECIFIED LOCATION: ICD-10-CM

## 2022-02-22 LAB
BILIRUB UR QL STRIP: NORMAL
GLUCOSE UR-MCNC: NEGATIVE MG/DL
KETONES P FAST UR STRIP-MCNC: NEGATIVE MG/DL
PH UR STRIP: 5.5 [PH] (ref 4.6–8)
PROT UR QL STRIP: NEGATIVE
SP GR UR STRIP: 1.03 (ref 1–1.03)
UA UROBILINOGEN AMB POC: NORMAL (ref 0.2–1)
URINALYSIS CLARITY POC: CLEAR
URINALYSIS COLOR POC: YELLOW
URINE BLOOD POC: NEGATIVE
URINE LEUKOCYTES POC: NEGATIVE
URINE NITRITES POC: NEGATIVE

## 2022-02-22 PROCEDURE — 81002 URINALYSIS NONAUTO W/O SCOPE: CPT | Performed by: STUDENT IN AN ORGANIZED HEALTH CARE EDUCATION/TRAINING PROGRAM

## 2022-02-22 PROCEDURE — 99213 OFFICE O/P EST LOW 20 MIN: CPT | Performed by: STUDENT IN AN ORGANIZED HEALTH CARE EDUCATION/TRAINING PROGRAM

## 2022-02-22 RX ORDER — CEFIXIME 400 MG/1
400 CAPSULE ORAL DAILY
Qty: 7 CAPSULE | Refills: 0 | Status: SHIPPED | OUTPATIENT
Start: 2022-02-22 | End: 2022-02-23

## 2022-02-22 NOTE — PROGRESS NOTES
Sonali Mckeon (: 1955) is a 78 y.o. female, established patient, here for evaluation of the following chief complaint(s):  (Patient reports vaginal odor x2 months.) and Sinus Infection (Test postive for covid last month. Patient states still dealing with fatigue and congestion. )       ASSESSMENT/PLAN:  Below is the assessment and plan developed based on review of pertinent history, physical exam, labs, studies, and medications. 1. Vaginal odor  -     AMB POC URINALYSIS DIP STICK MANUAL W/O MICRO  -     NUSWAB VAGINITIS PLUS; Future  - Discussed best hygiene practices and avoiding vaginal douching   - Unremarkable pelvic exam therefore will await results of the Nuswab checking for BV, Trich and gonorrhea/chlamydia   - Return precautions discussed with patient and she stated her understanding     2. Sinusitis, unspecified chronicity, unspecified location  -     cefixime (SUPRAX) 400 mg capsule; Take 1 Capsule by mouth daily for 7 days. , Normal, Disp-7 Capsule, R-0  - Noted symptoms for the past month. No red flags or systemic symptoms   - Given duration of symptoms will treat with cefixime.   - Return precautions discussed with patient and she stated her understanding. Reviewed medication side effects and usage  - Patient to call in should she develop a yeast infection 2/2 abx usage       Return if symptoms worsen or fail to improve. SUBJECTIVE/OBJECTIVE:  Patient is a 67yo female who presents to the office today for concerns for vaginal odor as well as concern for sinus infection. Patient states she noted vaginal odor about 2 months ago and she does have a small amount of clear discharge but otherwise denies any other symptoms. Denies any vaginal bleeding, itching, rash or pain. She denies concerns for STDs. Patient does state she has been vaginal douching on occasion. Patient also complains of continued congestion and nasal discharge for the past month since she was diagnosed with covid.  She did have a cough but states that has resolved. She feels congestion in her nose but denies sinus pain. Complains of thick nasal discharge. Denies any fevers or chills, headaches, vision changes, shortness of breath, swelling or eye/sinus pain. Of note patient gets GI upset with doxycycline and has a pencillin allergy. Denies severe allergic reaction to penicillin and has also tolerated Keflex without issues in the past.         Review of Systems   Constitutional: Negative for chills and fever. HENT: Positive for congestion and rhinorrhea. Negative for ear pain, facial swelling, sinus pain and sore throat. Eyes: Negative for visual disturbance. Respiratory: Negative for cough and shortness of breath. Cardiovascular: Negative for chest pain, palpitations and leg swelling. Gastrointestinal: Negative for abdominal pain, nausea and vomiting. Genitourinary: Negative for dysuria, frequency, hematuria, urgency, vaginal bleeding, vaginal discharge and vaginal pain. Vaginal odor   Skin: Negative for rash. Neurological: Negative for dizziness, light-headedness, numbness and headaches. Vital Signs  Temp: 96.8 °F (36 °C) (02/22/22 1509)  Pulse (Heart Rate): 100 (02/22/22 1509)  Resp Rate: 16 (02/22/22 1509)  O2 Sat (%): 97 % (02/22/22 1509)  BP: 105/70 (02/22/22 1509)  MAP (Calculated): 82 (02/22/22 1509)  BP 1 Location: Left arm (02/22/22 1509)  BP Patient Position: Sitting (02/22/22 1509)      Physical Exam  Exam conducted with a chaperone present. Constitutional:       Appearance: Normal appearance. HENT:      Head: Normocephalic and atraumatic. Right Ear: Tympanic membrane and ear canal normal.      Left Ear: Tympanic membrane and ear canal normal.      Nose: Nose normal.      Mouth/Throat:      Mouth: Mucous membranes are moist.      Pharynx: Oropharynx is clear. No oropharyngeal exudate or posterior oropharyngeal erythema.    Eyes:      Extraocular Movements: Extraocular movements intact. Conjunctiva/sclera: Conjunctivae normal.      Pupils: Pupils are equal, round, and reactive to light. Cardiovascular:      Rate and Rhythm: Normal rate and regular rhythm. Pulses: Normal pulses. Heart sounds: Normal heart sounds. No murmur heard. No gallop. Pulmonary:      Effort: Pulmonary effort is normal. No respiratory distress. Breath sounds: Normal breath sounds. No wheezing or rhonchi. Abdominal:      General: Bowel sounds are normal. There is no distension. Palpations: Abdomen is soft. Tenderness: There is no abdominal tenderness. Genitourinary:     Pubic Area: No rash. Labia:         Right: No rash or lesion. Left: No lesion. Vagina: Normal.      Cervix: Normal.      Uterus: Normal.       Adnexa:         Right: No tenderness. Left: No tenderness. Comments: No cervical motion tenderness   Musculoskeletal:      Cervical back: Neck supple. Right lower leg: No edema. Left lower leg: No edema. Lymphadenopathy:      Cervical: No cervical adenopathy. Neurological:      Mental Status: She is alert and oriented to person, place, and time. An electronic signature was used to authenticate this note.   -- Rafael Acosta MD

## 2022-02-22 NOTE — PROGRESS NOTES
1. Have you been to the ER, urgent care clinic since your last visit? Hospitalized since your last visit? [facility] 1[facility]2022 covid Positive    2. Have you seen or consulted any other health care providers outside of the [address] [place] [address] since your last visit? Include any pap smears or colon screening. No    Chief Complaint   Patient presents with    Bladder Infection     Patient reports odor x2 months.  Sinus Infection     Test postive for covid last month. Patient states still dealing with fatigue and congestion. Health Maintenance Due   Topic Date Due    Hepatitis C Screening  Never done    COVID-19 Vaccine (1) Never done    Shingrix Vaccine Age 50> (1 of 2) Never done    Bone Densitometry (Dexa) Screening  Never done    Pneumococcal 65+ years (2 of 2 - PPSV23) 05/03/2019    Flu Vaccine (1) 09/01/2021    Depression Monitoring  02/26/2022    Medicare Yearly Exam  02/27/2022     3 most recent PHQ Screens 2/22/2022   Little interest or pleasure in doing things Several days   Feeling down, depressed, irritable, or hopeless Several days   Total Score PHQ 2 2     Abuse Screening Questionnaire 2/22/2022   Do you ever feel afraid of your partner? N   Are you in a relationship with someone who physically or mentally threatens you? N   Is it safe for you to go home?  Y     Visit Vitals  /70 (BP 1 Location: Left arm, BP Patient Position: Sitting, BP Cuff Size: Adult)   Pulse 100   Temp 96.8 °F (36 °C) (Skin)   Resp 16   Ht 5' 2\" (1.575 m)   Wt 212 lb (96.2 kg)   SpO2 97%   BMI 38.78 kg/m²

## 2022-02-23 DIAGNOSIS — J32.9 SINUSITIS, UNSPECIFIED CHRONICITY, UNSPECIFIED LOCATION: Primary | ICD-10-CM

## 2022-02-23 RX ORDER — CEFPODOXIME PROXETIL 100 MG/1
200 TABLET, FILM COATED ORAL 2 TIMES DAILY
Qty: 28 TABLET | Refills: 0 | Status: SHIPPED | OUTPATIENT
Start: 2022-02-23 | End: 2022-03-02

## 2022-02-26 LAB
A VAGINAE DNA VAG QL NAA+PROBE: NORMAL SCORE
BVAB2 DNA VAG QL NAA+PROBE: NORMAL SCORE
C ALBICANS DNA VAG QL NAA+PROBE: NEGATIVE
C GLABRATA DNA VAG QL NAA+PROBE: NEGATIVE
C TRACH DNA VAG QL NAA+PROBE: NEGATIVE
MEGA1 DNA VAG QL NAA+PROBE: NORMAL SCORE
N GONORRHOEA DNA VAG QL NAA+PROBE: NEGATIVE
T VAGINALIS DNA VAG QL NAA+PROBE: NEGATIVE

## 2022-02-27 NOTE — PROGRESS NOTES
Please call patient to inform her that her Nuswab was negative for BV, yeast, Trichomonas, Chlamydia and gonorrhea. Please advise her that should her vaginal symptoms continue and/or worsen then she should schedule an appointment for re-evaluation.

## 2022-03-11 ENCOUNTER — TELEPHONE (OUTPATIENT)
Dept: FAMILY MEDICINE CLINIC | Age: 67
End: 2022-03-11

## 2022-03-11 NOTE — TELEPHONE ENCOUNTER
Called patient , just need a referral to see Dermatology this information was giving to [facility]

## 2022-03-11 NOTE — TELEPHONE ENCOUNTER
----- Message from Jaskaran Bailey sent at 3/11/2022  3:39 PM EST -----  Subject: Referral Request    QUESTIONS   Reason for referral request? To renew prescription and they wont write   Has the physician seen you for this condition before? Yes  Select a date? 2021-03-01  Select the Provider the patient wants to be referred to, if known (PCP or   Specialist)? Outside Physician - Alexander Enciso   Preferred Specialist (if applicable)? Do you already have an appointment scheduled? Yes  Select Scheduled Date? 2022-03-17  Select Scheduled Physician? Outside Physician - Alexander Enciso  Additional Information for Provider? pt states you wont be able to get in   contact with her until after 3 pm due to work.  ---------------------------------------------------------------------------  --------------  [address]  What is the best way for the office to contact you? OK to leave message on   voicemail  Preferred Call Back Phone Number?  [phone]

## 2022-03-18 PROBLEM — M47.816 OSTEOARTHRITIS OF LUMBAR SPINE: Status: ACTIVE | Noted: 2019-12-24

## 2022-03-19 PROBLEM — F34.1 DYSTHYMIC: Status: ACTIVE | Noted: 2019-12-24

## 2022-03-19 PROBLEM — L40.9 PSORIASIS: Status: ACTIVE | Noted: 2019-12-24

## 2022-03-20 PROBLEM — J42 CHRONIC BRONCHITIS (HCC): Status: ACTIVE | Noted: 2020-01-08

## 2022-04-14 DIAGNOSIS — F33.42 RECURRENT MAJOR DEPRESSIVE DISORDER, IN FULL REMISSION (HCC): ICD-10-CM

## 2022-04-14 RX ORDER — BUPROPION HYDROCHLORIDE 150 MG/1
TABLET, EXTENDED RELEASE ORAL
Qty: 180 TABLET | Refills: 0 | Status: SHIPPED | OUTPATIENT
Start: 2022-04-14 | End: 2022-06-29 | Stop reason: SDUPTHER

## 2022-05-18 ENCOUNTER — OFFICE VISIT (OUTPATIENT)
Dept: FAMILY MEDICINE CLINIC | Age: 67
End: 2022-05-18
Payer: MEDICARE

## 2022-05-18 VITALS
TEMPERATURE: 96.9 F | DIASTOLIC BLOOD PRESSURE: 62 MMHG | HEIGHT: 62 IN | OXYGEN SATURATION: 97 % | SYSTOLIC BLOOD PRESSURE: 100 MMHG | HEART RATE: 89 BPM | RESPIRATION RATE: 16 BRPM | BODY MASS INDEX: 38.09 KG/M2 | WEIGHT: 207 LBS

## 2022-05-18 DIAGNOSIS — E78.00 HYPERCHOLESTEROLEMIA: ICD-10-CM

## 2022-05-18 DIAGNOSIS — Z86.2 HISTORY OF IRON DEFICIENCY ANEMIA: ICD-10-CM

## 2022-05-18 DIAGNOSIS — G47.62 NOCTURNAL LEG CRAMPS: ICD-10-CM

## 2022-05-18 DIAGNOSIS — H61.21 IMPACTED CERUMEN OF RIGHT EAR: ICD-10-CM

## 2022-05-18 DIAGNOSIS — E03.9 ACQUIRED HYPOTHYROIDISM: Primary | ICD-10-CM

## 2022-05-18 PROCEDURE — 99214 OFFICE O/P EST MOD 30 MIN: CPT | Performed by: STUDENT IN AN ORGANIZED HEALTH CARE EDUCATION/TRAINING PROGRAM

## 2022-05-18 PROCEDURE — 69209 REMOVE IMPACTED EAR WAX UNI: CPT | Performed by: STUDENT IN AN ORGANIZED HEALTH CARE EDUCATION/TRAINING PROGRAM

## 2022-05-18 RX ORDER — DICLOFENAC SODIUM 75 MG/1
TABLET, DELAYED RELEASE ORAL
COMMUNITY
Start: 2022-04-30 | End: 2022-06-28 | Stop reason: SDUPTHER

## 2022-05-18 NOTE — PROGRESS NOTES
1. Have you been to the ER, urgent care clinic since your last visit? Hospitalized since your last visit? No    2. Have you seen or consulted any other health care providers outside of the [address] [place] [address] since your last visit? Include any pap smears or colon screening. No     Chief Complaint   Patient presents with    Thyroid Problem    Cholesterol Problem    Claudication    Wax in Ear     Health Maintenance Due   Topic Date Due    Hepatitis C Screening  Never done    COVID-19 Vaccine (1) Never done    Shingrix Vaccine Age 50> (1 of 2) Never done    Pneumococcal 65+ years (2 - PPSV23 or PCV20) 11/21/2015    Bone Densitometry (Dexa) Screening  Never done    Medicare Yearly Exam  02/27/2022     3 most recent PHQ Screens 5/18/2022   Little interest or pleasure in doing things Not at all   Feeling down, depressed, irritable, or hopeless Not at all   Total Score PHQ 2 0     Abuse Screening Questionnaire 5/18/2022   Do you ever feel afraid of your partner? N   Are you in a relationship with someone who physically or mentally threatens you? N   Is it safe for you to go home? Y     Fall Risk Assessment, last 12 mths 5/18/2022   Able to walk? Yes   Fall in past 12 months? 0   Do you feel unsteady? 0   Are you worried about falling 0   Number of falls in past 12 months -   Fall with injury?  -       Visit Vitals  /62 (BP 1 Location: Left arm, BP Patient Position: Sitting, BP Cuff Size: Adult)   Pulse 89   Temp 96.9 °F (36.1 °C) (Skin)   Resp 16   Ht 5' 2\" (1.575 m)   Wt 207 lb (93.9 kg)   SpO2 97%   BMI 37.86 kg/m²

## 2022-05-18 NOTE — PROGRESS NOTES
Assessment/Plan:     Diagnoses and all orders for this visit:    1. Acquired hypothyroidism  -     TSH 3RD GENERATION; Future  -     MAGNESIUM; Future  - Chronic.   - Will repeat TSH to ensure adequate control.   - Will adjust medication if needed pending lab results     2. Hypercholesterolemia  -     LIPID PANEL; Future  -     METABOLIC PANEL, COMPREHENSIVE; Future  - Chronic. Not currently on any medication  - Repeat lipid panel for re-evaluation    3. Nocturnal leg cramps  -     FERRITIN; Future  -     CBC WITH AUTOMATED DIFF; Future  -     VITAMIN D, 25 HYDROXY; Future  -     MAGNESIUM; Future  - Chronic. Nocturnal. Normal pulses in bilateral LE.   - Will check basic lab work to rule out other underlying etiologies  - Her low back pain may be contributing and she is supposed to follow-up for MRI imaging with ortho therefore advised follow-up   - Patient to follow-up if continued symptoms or worsening of symptoms     4. Impacted cerumen of right ear  -     REMOVAL IMPACTED CERUMEN IRRIGATION/LVG UNILAT  - Partial cerumen impaction noted in right ear. - Flushed and cleaned in office today        Follow-up and Dispositions    · Return if symptoms worsen or fail to improve. Discussed expected course/resolution/complications of diagnosis in detail with patient. Medication risks/benefits/costs/interactions/alternatives discussed with patient. Pt expressed understanding with the diagnosis and plan        Subjective:      Joyce Russo is a 75 y.o. female who presents for had concerns including Thyroid Problem, Cholesterol Problem, Claudication, and Wax in Ear. Current Outpatient Medications   Medication Sig Dispense Refill    buPROPion SR (WELLBUTRIN SR) 150 mg SR tablet TAKE 1 TABLET BY MOUTH TWICE DAILY 180 Tablet 0    levothyroxine (SYNTHROID) 50 mcg tablet Take 1 Tablet by mouth Daily (before breakfast).  90 Tablet 3    dicyclomine (BENTYL) 10 mg capsule TAKE 1 CAPSULE BY MOUTH EVERY 6 HOURS AS NEEDED      famotidine (PEPCID) 40 mg tablet Take 1 Tablet by mouth daily. 90 Tablet 5    ibuprofen (MOTRIN) 800 mg tablet Take 1 Tablet by mouth daily as needed for Pain. 30 Tablet 0    guselkumab (TREMFYA SC) by SubCUTAneous route. 1 injection every other month. Prescribed by Dermatology.  Lactobacillus acidophilus (PROBIOTIC PO) Take  by mouth.  cetirizine (ZYRTEC) 10 mg tablet Take  by mouth.  simethicone (GAS RELIEF PO) Take  by mouth.       diclofenac EC (VOLTAREN) 75 mg EC tablet  (Patient not taking: Reported on 5/18/2022)      pantoprazole (PROTONIX) 40 mg tablet TAKE 1 TABLET BY MOUTH TWICE DAILY (Patient not taking: Reported on 5/18/2022) 180 Tablet 1    albuterol (PROVENTIL HFA, VENTOLIN HFA, PROAIR HFA) 90 mcg/actuation inhaler  (Patient not taking: Reported on 2/22/2022)         Allergies   Allergen Reactions    Penicillins Shortness of Breath     Unknown reaction, but remembered she had one    Gabapentin Palpitations    Procaine Other (comments)     Had a shot in mouth and felt like eye was going to pop out of her head     Past Medical History:   Diagnosis Date    DJD (degenerative joint disease)     Osteoarthritis of lumbar spine 12/24/2019    Psoriasis     Recurrent major depressive disorder, in full remission ([facility] [address] 12/24/2019     Past Surgical History:   Procedure Laterality Date    HX MYOMECTOMY      HX WRIST FRACTURE TX       Family History   Problem Relation Age of Onset    Hypertension Mother     Heart Attack Father     Other Sister         anuersym    Hypertension Sister     Other Brother         brain tumor    Breast Cancer Maternal Aunt      Social History     Socioeconomic History    Marital status:      Spouse name: Not on file    Number of children: Not on file    Years of education: Not on file    Highest education level: Not on file   Occupational History    Not on file   Tobacco Use    Smoking status: Former Smoker    Smokeless tobacco: Former User   Substance and Sexual Activity    Alcohol use: Yes     Alcohol/week: 2.0 standard drinks     Types: 2 Cans of beer per week     Comment: ocassionally    Drug use: Never    Sexual activity: Not Currently   Other Topics Concern    Not on file   Social History Narrative    Not on file     Social Determinants of Health     Financial Resource Strain:     Difficulty of Paying Living Expenses: Not on file   Food Insecurity:     Worried About Running Out of Food in the Last Year: Not on file    Adama of Food in the Last Year: Not on file   Transportation Needs:     Lack of Transportation (Medical): Not on file    Lack of Transportation (Non-Medical): Not on file   Physical Activity:     Days of Exercise per Week: Not on file    Minutes of Exercise per Session: Not on file   Stress:     Feeling of Stress : Not on file   Social Connections:     Frequency of Communication with Friends and Family: Not on file    Frequency of Social Gatherings with Friends and Family: Not on file    Attends Mosque Services: Not on file    Active Member of [address] [place] [address] or Organizations: Not on file    Attends Club or Organization Meetings: Not on file    Marital Status: Not on file   Intimate Partner Violence:     Fear of Current or Ex-Partner: Not on file    Emotionally Abused: Not on file    Physically Abused: Not on file    Sexually Abused: Not on file   Housing Stability:     Unable to Pay for Housing in the Last Year: Not on file    Number of [place] in the Last Year: Not on file    Unstable Housing in the Last Year: Not on file       Patient is a 76 yo female who presents to the office today for follow-up of her hypothyroidism, hypercholesterolemia and also concerns for nocturnal leg cramps. Patient has a hx of hypothyroidism and is currently on levothyroxine. She would like to have her TSH checked as well as her cholesterol.  Patient also complains of a several month history of nocturnal leg cramps in the left leg primarily. She has a hx of lumbar spinal surgery and chronic left sided low back pain but the leg cramps have only been noted over the past several months. She has been getting Flexeril from a different provider for as needed with the leg cramps and this does seem to help some. She states the cramps only come at night and not when walking. She denies any numbness/tingling or weakness. ROS:   Review of Systems   Constitutional: Negative for chills and fever. HENT: Negative for congestion. Eyes: Negative for blurred vision. Respiratory: Negative for cough and shortness of breath. Cardiovascular: Negative for chest pain, palpitations and leg swelling. Gastrointestinal: Negative for abdominal pain, nausea and vomiting. Genitourinary: Negative for dysuria. Musculoskeletal: Positive for back pain. Nocturnal leg cramps    Neurological: Negative for dizziness, tingling, weakness and headaches. Objective:     Visit Vitals  /62 (BP 1 Location: Left arm, BP Patient Position: Sitting, BP Cuff Size: Adult)   Pulse 89   Temp 96.9 °F (36.1 °C) (Skin)   Resp 16   Ht 5' 2\" (1.575 m)   Wt 207 lb (93.9 kg)   SpO2 97%   BMI 37.86 kg/m²         Vitals and Nurse Documentation reviewed. Physical Exam  Constitutional:       General: She is not in acute distress. Appearance: Normal appearance. She is not toxic-appearing. HENT:      Head: Normocephalic and atraumatic. Right Ear: Tympanic membrane and ear canal normal. There is impacted cerumen. Left Ear: Tympanic membrane and ear canal normal.   Eyes:      Conjunctiva/sclera: Conjunctivae normal.   Cardiovascular:      Rate and Rhythm: Normal rate and regular rhythm. Pulses: Normal pulses. Heart sounds: Normal heart sounds. No murmur heard. No gallop. Pulmonary:      Effort: Pulmonary effort is normal. No respiratory distress. Breath sounds: Normal breath sounds. No wheezing or rhonchi. Abdominal:      General: Bowel sounds are normal. There is no distension. Palpations: Abdomen is soft. Tenderness: There is no abdominal tenderness. There is no guarding. Musculoskeletal:      Cervical back: Neck supple. Right lower leg: No edema. Left lower leg: No edema. Comments: Normal strength in bilateral LE; DP pulses 2+ in bilateral feet    Neurological:      Mental Status: She is alert and oriented to person, place, and time. Sensory: No sensory deficit. Motor: No weakness.       Gait: Gait normal.      Comments: Normal strength and sensation in bilateral LE; patellar reflexes 2+ bilaterally

## 2022-05-28 LAB
25(OH)D3+25(OH)D2 SERPL-MCNC: 21 NG/ML (ref 30–100)
ALBUMIN SERPL-MCNC: 3.7 G/DL (ref 3.8–4.8)
ALBUMIN/GLOB SERPL: 1.4 {RATIO} (ref 1.2–2.2)
ALP SERPL-CCNC: 56 IU/L (ref 44–121)
ALT SERPL-CCNC: 17 IU/L (ref 0–32)
AST SERPL-CCNC: 20 IU/L (ref 0–40)
BASOPHILS # BLD AUTO: 0.1 X10E3/UL (ref 0–0.2)
BASOPHILS NFR BLD AUTO: 1 %
BILIRUB SERPL-MCNC: 0.3 MG/DL (ref 0–1.2)
BUN SERPL-MCNC: 18 MG/DL (ref 8–27)
BUN/CREAT SERPL: 22 (ref 12–28)
CALCIUM SERPL-MCNC: 9.6 MG/DL (ref 8.7–10.3)
CHLORIDE SERPL-SCNC: 104 MMOL/L (ref 96–106)
CHOLEST SERPL-MCNC: 233 MG/DL (ref 100–199)
CO2 SERPL-SCNC: 24 MMOL/L (ref 20–29)
CREAT SERPL-MCNC: 0.81 MG/DL (ref 0.57–1)
EGFR: 79 ML/MIN/1.73
EOSINOPHIL # BLD AUTO: 0.4 X10E3/UL (ref 0–0.4)
EOSINOPHIL NFR BLD AUTO: 6 %
ERYTHROCYTE [DISTWIDTH] IN BLOOD BY AUTOMATED COUNT: 15 % (ref 11.7–15.4)
FERRITIN SERPL-MCNC: 23 NG/ML (ref 15–150)
GLOBULIN SER CALC-MCNC: 2.7 G/DL (ref 1.5–4.5)
GLUCOSE SERPL-MCNC: 96 MG/DL (ref 65–99)
HCT VFR BLD AUTO: 35.3 % (ref 34–46.6)
HDLC SERPL-MCNC: 56 MG/DL
HGB BLD-MCNC: 11.3 G/DL (ref 11.1–15.9)
IMM GRANULOCYTES # BLD AUTO: 0 X10E3/UL (ref 0–0.1)
IMM GRANULOCYTES NFR BLD AUTO: 0 %
LDLC SERPL CALC-MCNC: 150 MG/DL (ref 0–99)
LYMPHOCYTES # BLD AUTO: 2 X10E3/UL (ref 0.7–3.1)
LYMPHOCYTES NFR BLD AUTO: 31 %
MAGNESIUM SERPL-MCNC: 1.9 MG/DL (ref 1.6–2.3)
MCH RBC QN AUTO: 26.5 PG (ref 26.6–33)
MCHC RBC AUTO-ENTMCNC: 32 G/DL (ref 31.5–35.7)
MCV RBC AUTO: 83 FL (ref 79–97)
MONOCYTES # BLD AUTO: 0.5 X10E3/UL (ref 0.1–0.9)
MONOCYTES NFR BLD AUTO: 7 %
NEUTROPHILS # BLD AUTO: 3.7 X10E3/UL (ref 1.4–7)
NEUTROPHILS NFR BLD AUTO: 55 %
PLATELET # BLD AUTO: 326 X10E3/UL (ref 150–450)
POTASSIUM SERPL-SCNC: 4.4 MMOL/L (ref 3.5–5.2)
PROT SERPL-MCNC: 6.4 G/DL (ref 6–8.5)
RBC # BLD AUTO: 4.27 X10E6/UL (ref 3.77–5.28)
SODIUM SERPL-SCNC: 141 MMOL/L (ref 134–144)
TRIGL SERPL-MCNC: 153 MG/DL (ref 0–149)
TSH SERPL DL<=0.005 MIU/L-ACNC: 1.68 UIU/ML (ref 0.45–4.5)
VLDLC SERPL CALC-MCNC: 27 MG/DL (ref 5–40)
WBC # BLD AUTO: 6.7 X10E3/UL (ref 3.4–10.8)

## 2022-06-07 PROBLEM — E55.9 VITAMIN D INSUFFICIENCY: Status: ACTIVE | Noted: 2022-06-07

## 2022-06-07 PROBLEM — R73.03 PREDIABETES: Status: ACTIVE | Noted: 2022-06-07

## 2022-06-07 PROBLEM — E78.00 HYPERCHOLESTEROLEMIA: Status: ACTIVE | Noted: 2022-06-07

## 2022-06-28 ENCOUNTER — OFFICE VISIT (OUTPATIENT)
Dept: FAMILY MEDICINE CLINIC | Age: 67
End: 2022-06-28
Payer: MEDICARE

## 2022-06-28 VITALS
HEIGHT: 62 IN | BODY MASS INDEX: 38.28 KG/M2 | RESPIRATION RATE: 16 BRPM | SYSTOLIC BLOOD PRESSURE: 114 MMHG | DIASTOLIC BLOOD PRESSURE: 77 MMHG | HEART RATE: 87 BPM | WEIGHT: 208 LBS | TEMPERATURE: 97.1 F | OXYGEN SATURATION: 97 %

## 2022-06-28 DIAGNOSIS — Z12.11 SCREEN FOR COLON CANCER: ICD-10-CM

## 2022-06-28 DIAGNOSIS — E55.9 VITAMIN D DEFICIENCY: ICD-10-CM

## 2022-06-28 DIAGNOSIS — Z78.0 POSTMENOPAUSAL STATE: ICD-10-CM

## 2022-06-28 DIAGNOSIS — G89.29 CHRONIC LEFT SHOULDER PAIN: ICD-10-CM

## 2022-06-28 DIAGNOSIS — Z11.59 ENCOUNTER FOR HEPATITIS C SCREENING TEST FOR LOW RISK PATIENT: ICD-10-CM

## 2022-06-28 DIAGNOSIS — E78.00 HYPERCHOLESTEROLEMIA: ICD-10-CM

## 2022-06-28 DIAGNOSIS — R19.7 DIARRHEA, UNSPECIFIED TYPE: ICD-10-CM

## 2022-06-28 DIAGNOSIS — Z00.00 MEDICARE ANNUAL WELLNESS VISIT, SUBSEQUENT: Primary | ICD-10-CM

## 2022-06-28 DIAGNOSIS — Z12.31 ENCOUNTER FOR SCREENING MAMMOGRAM FOR MALIGNANT NEOPLASM OF BREAST: ICD-10-CM

## 2022-06-28 DIAGNOSIS — M25.512 CHRONIC LEFT SHOULDER PAIN: ICD-10-CM

## 2022-06-28 PROCEDURE — G8400 PT W/DXA NO RESULTS DOC: HCPCS | Performed by: FAMILY MEDICINE

## 2022-06-28 PROCEDURE — G8417 CALC BMI ABV UP PARAM F/U: HCPCS | Performed by: FAMILY MEDICINE

## 2022-06-28 PROCEDURE — 3017F COLORECTAL CA SCREEN DOC REV: CPT | Performed by: FAMILY MEDICINE

## 2022-06-28 PROCEDURE — 1101F PT FALLS ASSESS-DOCD LE1/YR: CPT | Performed by: FAMILY MEDICINE

## 2022-06-28 PROCEDURE — G8536 NO DOC ELDER MAL SCRN: HCPCS | Performed by: FAMILY MEDICINE

## 2022-06-28 PROCEDURE — 1123F ACP DISCUSS/DSCN MKR DOCD: CPT | Performed by: FAMILY MEDICINE

## 2022-06-28 PROCEDURE — G9899 SCRN MAM PERF RSLTS DOC: HCPCS | Performed by: FAMILY MEDICINE

## 2022-06-28 PROCEDURE — G0439 PPPS, SUBSEQ VISIT: HCPCS | Performed by: FAMILY MEDICINE

## 2022-06-28 PROCEDURE — G9717 DOC PT DX DEP/BP F/U NT REQ: HCPCS | Performed by: FAMILY MEDICINE

## 2022-06-28 PROCEDURE — G8427 DOCREV CUR MEDS BY ELIG CLIN: HCPCS | Performed by: FAMILY MEDICINE

## 2022-06-28 RX ORDER — ROSUVASTATIN CALCIUM 10 MG/1
10 TABLET, COATED ORAL
Qty: 90 TABLET | Refills: 2 | Status: SHIPPED | OUTPATIENT
Start: 2022-06-28

## 2022-06-28 RX ORDER — ERGOCALCIFEROL 1.25 MG/1
50000 CAPSULE ORAL
Qty: 12 CAPSULE | Refills: 5 | Status: SHIPPED | OUTPATIENT
Start: 2022-06-28

## 2022-06-28 RX ORDER — DICLOFENAC SODIUM 75 MG/1
75 TABLET, DELAYED RELEASE ORAL
Qty: 60 TABLET | Refills: 1 | Status: SHIPPED | OUTPATIENT
Start: 2022-06-28 | End: 2022-07-25 | Stop reason: SDUPTHER

## 2022-06-28 NOTE — PATIENT INSTRUCTIONS

## 2022-06-28 NOTE — PROGRESS NOTES
1. Have you been to the ER, urgent care clinic since your last visit? Hospitalized since your last visit? No    2. Have you seen or consulted any other health care providers outside of the [address] [place] [address] since your last visit? Include any pap smears or colon screening. No     Chief Complaint   Patient presents with    Follow-up    Medication Refill     Diclofenac     Results    Foot Pain     Left     Health Maintenance Due   Topic Date Due    Hepatitis C Screening  Never done    COVID-19 Vaccine (1) Never done    Shingrix Vaccine Age 50> (1 of 2) Never done    Pneumococcal 65+ years (2 - PPSV23 or PCV20) 11/21/2015    Bone Densitometry (Dexa) Screening  Never done    Medicare Yearly Exam  02/27/2022     3 most recent PHQ Screens 6/28/2022   Little interest or pleasure in doing things Not at all   Feeling down, depressed, irritable, or hopeless Not at all   Total Score PHQ 2 0     Abuse Screening Questionnaire 6/28/2022   Do you ever feel afraid of your partner? N   Are you in a relationship with someone who physically or mentally threatens you? N   Is it safe for you to go home?  Y     Visit Vitals  /77 (BP 1 Location: Left arm, BP Patient Position: Sitting, BP Cuff Size: Adult)   Pulse 87   Temp 97.1 °F (36.2 °C) (Skin)   Resp 16   Ht 5' 2\" (1.575 m)   Wt 208 lb (94.3 kg)   SpO2 97%   BMI 38.04 kg/m²

## 2022-06-28 NOTE — PROGRESS NOTES
This is the Subsequent Medicare Annual Wellness Exam, performed 12 months or more after the Initial AWV or the last Subsequent AWV    I have reviewed the patient's medical history in detail and updated the computerized patient record. Assessment/Plan   Education and counseling provided:  Are appropriate based on today's review and evaluation    1. Medicare annual wellness visit, subsequent  2. Diarrhea, unspecified type  -     CELIAC DISEASE PROFILE (REFLEX TTG, IGG); Future  -     REFERRAL TO GASTROENTEROLOGY  3. Screen for colon cancer  -     REFERRAL TO GASTROENTEROLOGY  4. Encounter for hepatitis C screening test for low risk patient  -     HEPATITIS C AB; Future  5. Postmenopausal state  -     DEXA BONE DENSITY STUDY AXIAL; Future  6. Encounter for screening mammogram for malignant neoplasm of breast  -     DONATO MAMMO BI SCREENING INCL CAD; Future  7. Hypercholesterolemia  -     rosuvastatin (CRESTOR) 10 mg tablet; Take 1 Tablet by mouth nightly., Normal, Disp-90 Tablet, R-2  8. Vitamin D deficiency  -     ergocalciferol (ERGOCALCIFEROL) 1,250 mcg (50,000 unit) capsule; Take 1 Capsule by mouth every seven (7) days. Indications: vitamin D deficiency (high dose therapy), Normal, Disp-12 Capsule, R-5  9. Chronic left shoulder pain  -     diclofenac EC (VOLTAREN) 75 mg EC tablet; Take 1 Tablet by mouth two (2) times daily as needed for Pain., Normal, Disp-60 Tablet, R-1       Depression Risk Factor Screening     3 most recent PHQ Screens 6/28/2022   Little interest or pleasure in doing things Not at all   Feeling down, depressed, irritable, or hopeless Not at all   Total Score PHQ 2 0       Alcohol & Drug Abuse Risk Screen    Do you average more than 1 drink per night or more than 7 drinks a week:  No    On any one occasion in the past three months have you have had more than 3 drinks containing alcohol:  No          Functional Ability and Level of Safety    Hearing: Hearing is good.       Activities of Daily Living: The home contains: handrails and grab bars  Patient does total self care      Ambulation: with no difficulty     Fall Risk:  Fall Risk Assessment, last 12 mths 6/28/2022   Able to walk? Yes   Fall in past 12 months? 0   Do you feel unsteady? 0   Are you worried about falling 0   Number of falls in past 12 months -   Fall with injury? -      Abuse Screen:  Patient is not abused       Cognitive Screening    Has your family/caregiver stated any concerns about your memory: no     Cognitive Screening: Normal - interview    Review of Systems   Constitutional: Negative. HENT: Negative. Eyes: Negative. Respiratory: Negative. Cardiovascular: Negative. Gastrointestinal: Negative. Genitourinary: Negative. Musculoskeletal: Positive for back pain and joint pain. Skin: Negative. Neurological: Negative. Endo/Heme/Allergies: Negative. Psychiatric/Behavioral: Negative. Physical Exam  Constitutional:       Appearance: Normal appearance. HENT:      Right Ear: Tympanic membrane, ear canal and external ear normal.      Left Ear: Tympanic membrane, ear canal and external ear normal.   Eyes:      Extraocular Movements: Extraocular movements intact. Conjunctiva/sclera: Conjunctivae normal.      Pupils: Pupils are equal, round, and reactive to light. Neck:      Thyroid: No thyromegaly. Cardiovascular:      Rate and Rhythm: Normal rate and regular rhythm. Pulmonary:      Effort: Pulmonary effort is normal.      Breath sounds: Normal breath sounds. Abdominal:      General: Bowel sounds are normal. There is no distension. Palpations: Abdomen is soft. Tenderness: There is no abdominal tenderness. Musculoskeletal:         General: Normal range of motion. Cervical back: Normal range of motion and neck supple. Right lower leg: No edema. Left lower leg: No edema. Lymphadenopathy:      Cervical: No cervical adenopathy. Skin:     General: Skin is warm and dry. Neurological:      General: No focal deficit present. Mental Status: She is alert and oriented to person, place, and time. Mental status is at baseline. Psychiatric:         Mood and Affect: Mood normal.         Behavior: Behavior normal.       Health Maintenance Due     Health Maintenance Due   Topic Date Due    Hepatitis C Screening  Never done    COVID-19 Vaccine (1) Never done    Shingrix Vaccine Age 50> (1 of 2) Never done    Pneumococcal 65+ years (2 - PPSV23 or PCV20) 11/21/2015    Bone Densitometry (Dexa) Screening  Never done       Patient Care Team   Patient Care Team:  Warden Marcie MD as PCP - General (Family Medicine)  Warden Marcie MD as PCP - [facility] Empaneled Provider    History     Patient Active Problem List   Diagnosis Code    Dysthymic F34.1    Psoriasis L40.9    Osteoarthritis of lumbar spine M47.816    Chronic bronchitis ([facility] [place] [address] J42    Hypercholesterolemia E78.00    Vitamin D insufficiency E55.9    Prediabetes R73.03     Past Medical History:   Diagnosis Date    DJD (degenerative joint disease)     Osteoarthritis of lumbar spine 12/24/2019    Psoriasis     Recurrent major depressive disorder, in full remission ([facility] [place] [address] 12/24/2019      Past Surgical History:   Procedure Laterality Date    HX MYOMECTOMY      HX WRIST FRACTURE TX       Current Outpatient Medications   Medication Sig Dispense Refill    ergocalciferol (ERGOCALCIFEROL) 1,250 mcg (50,000 unit) capsule Take 1 Capsule by mouth every seven (7) days. Indications: vitamin D deficiency (high dose therapy) 12 Capsule 5    rosuvastatin (CRESTOR) 10 mg tablet Take 1 Tablet by mouth nightly. 90 Tablet 2    diclofenac EC (VOLTAREN) 75 mg EC tablet Take 1 Tablet by mouth two (2) times daily as needed for Pain. 60 Tablet 1    buPROPion SR (WELLBUTRIN SR) 150 mg SR tablet TAKE 1 TABLET BY MOUTH TWICE DAILY 180 Tablet 0    levothyroxine (SYNTHROID) 50 mcg tablet Take 1 Tablet by mouth Daily (before breakfast).  90 Tablet 3  dicyclomine (BENTYL) 10 mg capsule TAKE 1 CAPSULE BY MOUTH EVERY 6 HOURS AS NEEDED      guselkumab (TREMFYA SC) by SubCUTAneous route. 1 injection every other month. Prescribed by Dermatology.  Lactobacillus acidophilus (PROBIOTIC PO) Take  by mouth.  cetirizine (ZYRTEC) 10 mg tablet Take  by mouth.  simethicone (GAS RELIEF PO) Take  by mouth.  pantoprazole (PROTONIX) 40 mg tablet TAKE 1 TABLET BY MOUTH TWICE DAILY (Patient not taking: Reported on 5/18/2022) 180 Tablet 1    albuterol (PROVENTIL HFA, VENTOLIN HFA, PROAIR HFA) 90 mcg/actuation inhaler  (Patient not taking: Reported on 2/22/2022)      famotidine (PEPCID) 40 mg tablet Take 1 Tablet by mouth daily. (Patient not taking: Reported on 6/28/2022) 90 Tablet 5     Allergies   Allergen Reactions    Penicillins Shortness of Breath     Unknown reaction, but remembered she had one    Gabapentin Palpitations    Procaine Other (comments)     Had a shot in mouth and felt like eye was going to pop out of her head       Family History   Problem Relation Age of Onset    Hypertension Mother     Heart Attack Father     Other Sister         anuersym    Hypertension Sister     Other Brother         brain tumor    Breast Cancer Maternal Aunt      Social History     Tobacco Use    Smoking status: Former Smoker    Smokeless tobacco: Former User   Substance Use Topics    Alcohol use:  Yes     Alcohol/week: 2.0 standard drinks     Types: 2 Cans of beer per week     Comment: saige Jett MD

## 2022-06-29 DIAGNOSIS — E03.9 HYPOTHYROIDISM, UNSPECIFIED TYPE: ICD-10-CM

## 2022-06-29 DIAGNOSIS — F33.42 RECURRENT MAJOR DEPRESSIVE DISORDER, IN FULL REMISSION (HCC): ICD-10-CM

## 2022-06-29 NOTE — TELEPHONE ENCOUNTER
Please advise!!    .  PCP: Rylie Miller MD    Last appt: 6/28/2022  No future appointments.     Requested Prescriptions      No prescriptions requested or ordered in this encounter       Prior labs and Blood pressures:  BP Readings from Last 3 Encounters:   06/28/22 114/77   05/18/22 100/62   02/22/22 105/70     Lab Results   Component Value Date/Time    Sodium 141 05/27/2022 08:57 AM    Potassium 4.4 05/27/2022 08:57 AM    Chloride 104 05/27/2022 08:57 AM    CO2 24 05/27/2022 08:57 AM    Anion gap 4 (L) 02/26/2021 12:18 PM    Glucose 96 05/27/2022 08:57 AM    BUN 18 05/27/2022 08:57 AM    Creatinine 0.81 05/27/2022 08:57 AM    BUN/Creatinine ratio 22 05/27/2022 08:57 AM    GFR est AA 77 07/21/2021 12:00 AM    GFR est non-AA 66 07/21/2021 12:00 AM    Calcium 9.6 05/27/2022 08:57 AM     Lab Results   Component Value Date/Time    Hemoglobin A1c 6.0 (H) 07/21/2021 12:00 AM     Lab Results   Component Value Date/Time    Cholesterol, total 233 (H) 05/27/2022 08:57 AM    HDL Cholesterol 56 05/27/2022 08:57 AM    LDL, calculated 150 (H) 05/27/2022 08:57 AM    LDL, calculated 126.6 (H) 02/26/2021 12:18 PM    VLDL, calculated 27 05/27/2022 08:57 AM    VLDL, calculated 33.4 02/26/2021 12:18 PM    Triglyceride 153 (H) 05/27/2022 08:57 AM    CHOL/HDL Ratio 3.3 02/26/2021 12:18 PM     Lab Results   Component Value Date/Time    VITAMIN D, 25-HYDROXY 21.0 (L) 05/27/2022 08:57 AM       Lab Results   Component Value Date/Time    TSH 1.680 05/27/2022 08:57 AM
PMD

## 2022-06-30 LAB
ENDOMYSIUM IGA SER QL: NEGATIVE
HCV AB S/CO SERPL IA: 0.2 S/CO RATIO (ref 0–0.9)
IGA SERPL-MCNC: 302 MG/DL (ref 87–352)
TTG IGA SER-ACNC: <2 U/ML (ref 0–3)

## 2022-07-05 RX ORDER — LEVOTHYROXINE SODIUM 50 UG/1
50 TABLET ORAL
Qty: 90 TABLET | Refills: 3 | Status: SHIPPED | OUTPATIENT
Start: 2022-07-05

## 2022-07-05 RX ORDER — BUPROPION HYDROCHLORIDE 150 MG/1
150 TABLET, EXTENDED RELEASE ORAL 2 TIMES DAILY
Qty: 180 TABLET | Refills: 0 | Status: SHIPPED | OUTPATIENT
Start: 2022-07-05 | End: 2022-10-10

## 2022-07-13 ENCOUNTER — OFFICE VISIT (OUTPATIENT)
Dept: FAMILY MEDICINE CLINIC | Age: 67
End: 2022-07-13

## 2022-07-13 ENCOUNTER — TRANSCRIBE ORDER (OUTPATIENT)
Dept: SCHEDULING | Age: 67
End: 2022-07-13

## 2022-07-13 VITALS
BODY MASS INDEX: 38.24 KG/M2 | DIASTOLIC BLOOD PRESSURE: 75 MMHG | SYSTOLIC BLOOD PRESSURE: 115 MMHG | OXYGEN SATURATION: 97 % | HEART RATE: 86 BPM | TEMPERATURE: 98.2 F | RESPIRATION RATE: 16 BRPM | WEIGHT: 207.8 LBS | HEIGHT: 62 IN

## 2022-07-13 DIAGNOSIS — M75.122 NONTRAUMATIC COMPLETE TEAR OF ROTATOR CUFF, LEFT: Primary | ICD-10-CM

## 2022-07-13 DIAGNOSIS — E03.9 ACQUIRED HYPOTHYROIDISM: ICD-10-CM

## 2022-07-13 DIAGNOSIS — R00.2 PALPITATIONS: Primary | ICD-10-CM

## 2022-07-13 PROCEDURE — 93000 ELECTROCARDIOGRAM COMPLETE: CPT | Performed by: STUDENT IN AN ORGANIZED HEALTH CARE EDUCATION/TRAINING PROGRAM

## 2022-07-13 PROCEDURE — 99214 OFFICE O/P EST MOD 30 MIN: CPT | Performed by: STUDENT IN AN ORGANIZED HEALTH CARE EDUCATION/TRAINING PROGRAM

## 2022-07-13 PROCEDURE — 1123F ACP DISCUSS/DSCN MKR DOCD: CPT | Performed by: STUDENT IN AN ORGANIZED HEALTH CARE EDUCATION/TRAINING PROGRAM

## 2022-07-13 NOTE — PROGRESS NOTES
Assessment/Plan:     Diagnoses and all orders for this visit:    1. Palpitations  -     CBC W/O DIFF; Future  -     METABOLIC PANEL, BASIC; Future  -     TSH 3RD GENERATION; Future  -     T4 (THYROXINE); Future  -     AMB POC EKG ROUTINE W/ 12 LEADS, INTER & REP  -Patient believes she may have only just started taking her levothyroxine over the past month. -Patient discontinued levothyroxine as she believes this was the cause of her palpitations   -Palpitations have stopped since discontinuing levothyroxine. -EKG in office today shows normal sinus rhythm  -Patient to monitor for any further episodes of palpitations. Will repeat lab work including TSH and T4 today. -If continued palpitations then consider referral to cardiology     2. Acquired hypothyroidism  -     TSH 3RD GENERATION; Future  -     T4 (THYROXINE); Future  -Patient discontinued her levothyroxine due to concern for palpitations.  -Will check a TSH and T4 today for reevaluation of her thyroid function  -Hold her levothyroxine for now while awaiting lab results        Follow-up and Dispositions    · Return if symptoms worsen or fail to improve. Discussed expected course/resolution/complications of diagnosis in detail with patient. Medication risks/benefits/costs/interactions/alternatives discussed with patient. Pt expressed understanding with the diagnosis and plan      Subjective:      Tricia Bustos is a 75 y.o. female who presents for had concerns including Follow-up (cant take new medication prescribed  not taking synthroid ). Current Outpatient Medications   Medication Sig Dispense Refill    levothyroxine (SYNTHROID) 50 mcg tablet Take 1 Tablet by mouth Daily (before breakfast). 90 Tablet 3    buPROPion SR (WELLBUTRIN SR) 150 mg SR tablet Take 1 Tablet by mouth two (2) times a day. 180 Tablet 0    ergocalciferol (ERGOCALCIFEROL) 1,250 mcg (50,000 unit) capsule Take 1 Capsule by mouth every seven (7) days.  Indications: vitamin D deficiency (high dose therapy) 12 Capsule 5    rosuvastatin (CRESTOR) 10 mg tablet Take 1 Tablet by mouth nightly. 90 Tablet 2    diclofenac EC (VOLTAREN) 75 mg EC tablet Take 1 Tablet by mouth two (2) times daily as needed for Pain. 60 Tablet 1    pantoprazole (PROTONIX) 40 mg tablet TAKE 1 TABLET BY MOUTH TWICE DAILY (Patient not taking: Reported on 5/18/2022) 180 Tablet 1    dicyclomine (BENTYL) 10 mg capsule TAKE 1 CAPSULE BY MOUTH EVERY 6 HOURS AS NEEDED      albuterol (PROVENTIL HFA, VENTOLIN HFA, PROAIR HFA) 90 mcg/actuation inhaler  (Patient not taking: Reported on 2/22/2022)      famotidine (PEPCID) 40 mg tablet Take 1 Tablet by mouth daily. (Patient not taking: Reported on 6/28/2022) 90 Tablet 5    guselkumab (TREMFYA SC) by SubCUTAneous route. 1 injection every other month. Prescribed by Dermatology.  Lactobacillus acidophilus (PROBIOTIC PO) Take  by mouth.  cetirizine (ZYRTEC) 10 mg tablet Take  by mouth.  simethicone (GAS RELIEF PO) Take  by mouth.          Allergies   Allergen Reactions    Penicillins Shortness of Breath     Unknown reaction, but remembered she had one    Gabapentin Palpitations    Procaine Other (comments)     Had a shot in mouth and felt like eye was going to pop out of her head     Past Medical History:   Diagnosis Date    DJD (degenerative joint disease)     Osteoarthritis of lumbar spine 12/24/2019    Psoriasis     Recurrent major depressive disorder, in full remission ([facility] [address] 12/24/2019     Past Surgical History:   Procedure Laterality Date    HX MYOMECTOMY      HX WRIST FRACTURE TX       Family History   Problem Relation Age of Onset    Hypertension Mother     Heart Attack Father     Other Sister         anuersym    Hypertension Sister     Other Brother         brain tumor    Breast Cancer Maternal Aunt      Social History     Socioeconomic History    Marital status:      Spouse name: Not on file    Number of children: Not on file  Years of education: Not on file    Highest education level: Not on file   Occupational History    Not on file   Tobacco Use    Smoking status: Former Smoker    Smokeless tobacco: Former User   Substance and Sexual Activity    Alcohol use: Yes     Alcohol/week: 2.0 standard drinks     Types: 2 Cans of beer per week     Comment: ocassionally    Drug use: Never    Sexual activity: Not Currently   Other Topics Concern    Not on file   Social History Narrative    Not on file     Social Determinants of Health     Financial Resource Strain:     Difficulty of Paying Living Expenses: Not on file   Food Insecurity:     Worried About Running Out of Food in the Last Year: Not on file    Adama of Food in the Last Year: Not on file   Transportation Needs:     Lack of Transportation (Medical): Not on file    Lack of Transportation (Non-Medical): Not on file   Physical Activity:     Days of Exercise per Week: Not on file    Minutes of Exercise per Session: Not on file   Stress:     Feeling of Stress : Not on file   Social Connections:     Frequency of Communication with Friends and Family: Not on file    Frequency of Social Gatherings with Friends and Family: Not on file    Attends Mandaen Services: Not on file    Active Member of [address] [place] [address] [employer] or Organizations: Not on file    Attends Club or Organization Meetings: Not on file    Marital Status: Not on file   Intimate Partner Violence:     Fear of Current or Ex-Partner: Not on file    Emotionally Abused: Not on file    Physically Abused: Not on file    Sexually Abused: Not on file   Housing Stability:     Unable to Pay for Housing in the Last Year: Not on file    Number of [place] in the Last Year: Not on file    Unstable Housing in the Last Year: Not on file       Patient is a 68-year-old female who presents to the office today for concerns of medication side effects causing palpitations.   Patient states that about 4 days ago she noted an 8-hour duration of palpitations. She believed it was due to her levothyroxine and discontinued this medication. She has been off of it for about 4 days now and states that she has not had any further palpitations. Of note it appears that she was prescribed levothyroxine 50 mcg daily about 1 year ago. Patient does not believe that she has been taking this for the past year. She believes she may have restarted this about 1 month ago. Her TSH when checked in May was normal however it was originally believed she was on the levothyroxine 50 mcg daily however it appears she may not have been taking the medication. Today she denies any chest pain, shortness of breath, palpitations, nausea, vomiting, dizziness or any other acute concerns. ROS:   Review of Systems   Constitutional: Negative for chills and fever. HENT: Negative for congestion. Eyes: Negative for blurred vision. Respiratory: Negative for cough and shortness of breath. Cardiovascular: Positive for palpitations. Negative for chest pain. Gastrointestinal: Negative for abdominal pain, nausea and vomiting. Genitourinary: Negative for dysuria. Neurological: Negative for dizziness, tingling, weakness and headaches. Objective:     Visit Vitals  /75 (BP 1 Location: Left upper arm, BP Patient Position: Sitting, BP Cuff Size: Large adult)   Pulse 86   Temp 98.2 °F (36.8 °C) (Temporal)   Resp 16   Ht 5' 2\" (1.575 m)   Wt 207 lb 12.8 oz (94.3 kg)   SpO2 97%   BMI 38.01 kg/m²         Vitals and Nurse Documentation reviewed. Physical Exam  Constitutional:       General: She is not in acute distress. Appearance: Normal appearance. She is obese. She is not toxic-appearing. HENT:      Head: Normocephalic and atraumatic. Eyes:      Conjunctiva/sclera: Conjunctivae normal.   Cardiovascular:      Rate and Rhythm: Normal rate and regular rhythm. Pulses: Normal pulses.            Dorsalis pedis pulses are 2+ on the right side and 2+ on the left side. Heart sounds: Normal heart sounds. No murmur heard. No gallop. Pulmonary:      Effort: Pulmonary effort is normal. No respiratory distress. Breath sounds: Normal breath sounds. No wheezing or rhonchi. Abdominal:      General: Bowel sounds are normal. There is no distension. Palpations: Abdomen is soft. Tenderness: There is no abdominal tenderness. There is no guarding. Musculoskeletal:      Cervical back: Neck supple. Right lower leg: No edema. Left lower leg: No edema. Neurological:      Mental Status: She is alert and oriented to person, place, and time.       Gait: Gait normal.

## 2022-07-13 NOTE — PROGRESS NOTES
1. Have you been to the ER, urgent care clinic since your last visit? Hospitalized since your last visit? No     2. Have you seen or consulted any other health care providers outside of the [address] [place] [address] since your last visit? Include any pap smears or colon screening.  No

## 2022-07-14 LAB
ANION GAP SERPL CALC-SCNC: 5 MMOL/L (ref 5–15)
BUN SERPL-MCNC: 23 MG/DL (ref 6–20)
BUN/CREAT SERPL: 25 (ref 12–20)
CALCIUM SERPL-MCNC: 9.9 MG/DL (ref 8.5–10.1)
CHLORIDE SERPL-SCNC: 111 MMOL/L (ref 97–108)
CO2 SERPL-SCNC: 27 MMOL/L (ref 21–32)
CREAT SERPL-MCNC: 0.92 MG/DL (ref 0.55–1.02)
ERYTHROCYTE [DISTWIDTH] IN BLOOD BY AUTOMATED COUNT: 17.7 % (ref 11.5–14.5)
GLUCOSE SERPL-MCNC: 103 MG/DL (ref 65–100)
HCT VFR BLD AUTO: 37.4 % (ref 35–47)
HGB BLD-MCNC: 11.4 G/DL (ref 11.5–16)
MCH RBC QN AUTO: 26.6 PG (ref 26–34)
MCHC RBC AUTO-ENTMCNC: 30.5 G/DL (ref 30–36.5)
MCV RBC AUTO: 87.2 FL (ref 80–99)
NRBC # BLD: 0 K/UL (ref 0–0.01)
NRBC BLD-RTO: 0 PER 100 WBC
PLATELET # BLD AUTO: 298 K/UL (ref 150–400)
PMV BLD AUTO: 10.9 FL (ref 8.9–12.9)
POTASSIUM SERPL-SCNC: 4.5 MMOL/L (ref 3.5–5.1)
RBC # BLD AUTO: 4.29 M/UL (ref 3.8–5.2)
SODIUM SERPL-SCNC: 143 MMOL/L (ref 136–145)
T4 SERPL-MCNC: 4.3 UG/DL (ref 4.8–13.9)
TSH SERPL DL<=0.05 MIU/L-ACNC: 2.98 UIU/ML (ref 0.36–3.74)
WBC # BLD AUTO: 8.2 K/UL (ref 3.6–11)

## 2022-07-19 ENCOUNTER — HOSPITAL ENCOUNTER (OUTPATIENT)
Dept: MRI IMAGING | Age: 67
Discharge: HOME | End: 2022-07-19
Attending: ORTHOPAEDIC SURGERY
Payer: MEDICARE

## 2022-07-19 DIAGNOSIS — M75.122 NONTRAUMATIC COMPLETE TEAR OF ROTATOR CUFF, LEFT: ICD-10-CM

## 2022-07-19 PROCEDURE — 73221 MRI JOINT UPR EXTREM W/O DYE: CPT

## 2022-07-25 ENCOUNTER — OFFICE VISIT (OUTPATIENT)
Dept: FAMILY MEDICINE CLINIC | Age: 67
End: 2022-07-25
Payer: MEDICARE

## 2022-07-25 VITALS
OXYGEN SATURATION: 96 % | DIASTOLIC BLOOD PRESSURE: 80 MMHG | BODY MASS INDEX: 38.39 KG/M2 | HEIGHT: 62 IN | TEMPERATURE: 98.2 F | WEIGHT: 208.6 LBS | HEART RATE: 83 BPM | RESPIRATION RATE: 16 BRPM | SYSTOLIC BLOOD PRESSURE: 116 MMHG

## 2022-07-25 DIAGNOSIS — E66.01 SEVERE OBESITY (BMI 35.0-39.9) WITH COMORBIDITY (HCC): ICD-10-CM

## 2022-07-25 DIAGNOSIS — M25.512 CHRONIC LEFT SHOULDER PAIN: ICD-10-CM

## 2022-07-25 DIAGNOSIS — G89.29 CHRONIC LEFT SHOULDER PAIN: ICD-10-CM

## 2022-07-25 DIAGNOSIS — J44.9 CHRONIC OBSTRUCTIVE PULMONARY DISEASE, UNSPECIFIED COPD TYPE (HCC): ICD-10-CM

## 2022-07-25 DIAGNOSIS — E03.9 ACQUIRED HYPOTHYROIDISM: Primary | ICD-10-CM

## 2022-07-25 PROCEDURE — 1101F PT FALLS ASSESS-DOCD LE1/YR: CPT | Performed by: FAMILY MEDICINE

## 2022-07-25 PROCEDURE — G8400 PT W/DXA NO RESULTS DOC: HCPCS | Performed by: FAMILY MEDICINE

## 2022-07-25 PROCEDURE — 1090F PRES/ABSN URINE INCON ASSESS: CPT | Performed by: FAMILY MEDICINE

## 2022-07-25 PROCEDURE — G9899 SCRN MAM PERF RSLTS DOC: HCPCS | Performed by: FAMILY MEDICINE

## 2022-07-25 PROCEDURE — 1123F ACP DISCUSS/DSCN MKR DOCD: CPT | Performed by: FAMILY MEDICINE

## 2022-07-25 PROCEDURE — 3017F COLORECTAL CA SCREEN DOC REV: CPT | Performed by: FAMILY MEDICINE

## 2022-07-25 PROCEDURE — G8536 NO DOC ELDER MAL SCRN: HCPCS | Performed by: FAMILY MEDICINE

## 2022-07-25 PROCEDURE — G8417 CALC BMI ABV UP PARAM F/U: HCPCS | Performed by: FAMILY MEDICINE

## 2022-07-25 PROCEDURE — 99213 OFFICE O/P EST LOW 20 MIN: CPT | Performed by: FAMILY MEDICINE

## 2022-07-25 PROCEDURE — G9717 DOC PT DX DEP/BP F/U NT REQ: HCPCS | Performed by: FAMILY MEDICINE

## 2022-07-25 PROCEDURE — G8427 DOCREV CUR MEDS BY ELIG CLIN: HCPCS | Performed by: FAMILY MEDICINE

## 2022-07-25 RX ORDER — ALBUTEROL SULFATE 90 UG/1
1 AEROSOL, METERED RESPIRATORY (INHALATION)
Qty: 18 G | Refills: 5 | Status: SHIPPED | OUTPATIENT
Start: 2022-07-25

## 2022-07-25 RX ORDER — DICLOFENAC SODIUM 75 MG/1
75 TABLET, DELAYED RELEASE ORAL
Qty: 60 TABLET | Refills: 1 | Status: SHIPPED | OUTPATIENT
Start: 2022-07-25 | End: 2022-08-22 | Stop reason: SDUPTHER

## 2022-07-25 NOTE — PROGRESS NOTES
2022   Noni Hollingsworth (: 1955) is a 75 y.o. female, established patient, here for evaluation of the following chief complaint(s):  Follow-up (Read labs/)     ASSESSMENT/PLAN:  Below is the assessment and plan developed based on review of pertinent history, physical exam, labs, studies, and medications. 1. Acquired hypothyroidism  -     THYROID CASCADE PROFILE; Future  2. Severe obesity (BMI 35.0-39. 9) with comorbidity ([address]  3. Chronic obstructive pulmonary disease, unspecified COPD type (HCC)  -     albuterol (PROVENTIL HFA, VENTOLIN HFA, PROAIR HFA) 90 mcg/actuation inhaler; Take 1 Puff by inhalation every six (6) hours as needed for Wheezing., Normal, Disp-18 g, R-5  4. Chronic left shoulder pain  -     diclofenac EC (VOLTAREN) 75 mg EC tablet; Take 1 Tablet by mouth two (2) times daily as needed for Pain., Normal, Disp-60 Tablet, R-1    Return in about 4 weeks (around 2022) for labs. SUBJECTIVE/OBJECTIVE:  HPI   1. Acquired hypothyroidism    Patient is seen for followup of hypothyroidism. Patient is taking medication as prescribed. Currently stopped taking thyroid medication. Thyroid ROS: denies fatigue, tremor, weight changes, heat/cold intolerance, bowel/skin changes, palpitations  Last TSH reviewed:   Lab Results   Component Value Date/Time    TSH 2.98 2022 04:11 PM          2. Severe obesity (BMI 35.0-39. 9) with comorbidity ([address]  Advised to start low-fat diet and exercise    3. Chronic obstructive pulmonary disease, unspecified COPD type ([address]  Patient reports history of COPD. She is requesting refills of albuterol inhaler. She uses inhaler as needed. 4. Chronic left shoulder pain  Patient reports chronic left shoulder pain. She was advised surgery due to rotator cuff tear. Patient declined surgery. She recently had injections in the shoulder joint. She will follow-up with Ortho. She is requesting refills of diclofenac.   I have advised her to only take diclofenac as needed. No results found for any visits on 07/25/22. Review of Systems   Constitutional: Negative. HENT: Negative. Eyes: Negative. Respiratory: Negative. Cardiovascular: Negative. Gastrointestinal: Negative. Genitourinary: Negative. Musculoskeletal: Negative. Skin: Negative. Neurological: Negative. Endo/Heme/Allergies: Negative. Psychiatric/Behavioral: Negative. Physical Exam  Vitals and nursing note reviewed. HENT:      Head: Normocephalic and atraumatic. Right Ear: External ear normal.      Left Ear: External ear normal.      Nose: Nose normal.   Eyes:      Conjunctiva/sclera: Conjunctivae normal.   Cardiovascular:      Rate and Rhythm: Normal rate and regular rhythm. Pulmonary:      Effort: Pulmonary effort is normal.      Breath sounds: Normal breath sounds. Abdominal:      General: Bowel sounds are normal. There is no distension. Palpations: Abdomen is soft. Tenderness: There is no abdominal tenderness. Musculoskeletal:         General: Normal range of motion. Cervical back: Normal range of motion and neck supple. Skin:     General: Skin is warm and dry. Neurological:      General: No focal deficit present. Mental Status: She is alert.      /80 (BP 1 Location: Right arm, BP Patient Position: Sitting, BP Cuff Size: Large adult)   Pulse 83   Temp 98.2 °F (36.8 °C) (Temporal)   Resp 16   Ht 5' 2\" (1.575 m)   Wt 208 lb 9.6 oz (94.6 kg)   SpO2 96%   BMI 38.15 kg/m²     Allergies   Allergen Reactions    Penicillins Shortness of Breath     Unknown reaction, but remembered she had one    Gabapentin Palpitations    Procaine Other (comments)     Had a shot in mouth and felt like eye was going to pop out of her head       Current Outpatient Medications   Medication Sig    albuterol (PROVENTIL HFA, VENTOLIN HFA, PROAIR HFA) 90 mcg/actuation inhaler Take 1 Puff by inhalation every six (6) hours as needed for Wheezing. diclofenac EC (VOLTAREN) 75 mg EC tablet Take 1 Tablet by mouth two (2) times daily as needed for Pain. buPROPion SR (WELLBUTRIN SR) 150 mg SR tablet Take 1 Tablet by mouth two (2) times a day. ergocalciferol (ERGOCALCIFEROL) 1,250 mcg (50,000 unit) capsule Take 1 Capsule by mouth every seven (7) days. Indications: vitamin D deficiency (high dose therapy)    rosuvastatin (CRESTOR) 10 mg tablet Take 1 Tablet by mouth nightly. pantoprazole (PROTONIX) 40 mg tablet TAKE 1 TABLET BY MOUTH TWICE DAILY    dicyclomine (BENTYL) 10 mg capsule TAKE 1 CAPSULE BY MOUTH EVERY 6 HOURS AS NEEDED    guselkumab (TREMFYA SC) by SubCUTAneous route. 1 injection every other month. Prescribed by Dermatology. Lactobacillus acidophilus (PROBIOTIC PO) Take  by mouth. cetirizine (ZYRTEC) 10 mg tablet Take  by mouth. simethicone (GAS RELIEF PO) Take  by mouth.    levothyroxine (SYNTHROID) 50 mcg tablet Take 1 Tablet by mouth Daily (before breakfast). (Patient not taking: Reported on 7/25/2022)    famotidine (PEPCID) 40 mg tablet Take 1 Tablet by mouth daily. (Patient not taking: Reported on 7/25/2022)     No current facility-administered medications for this visit. Past Medical History:   Diagnosis Date    DJD (degenerative joint disease)     Osteoarthritis of lumbar spine 12/24/2019    Psoriasis     Recurrent major depressive disorder, in full remission ([facility] 12/24/2019       Past Surgical History:   Procedure Laterality Date    HX MYOMECTOMY      HX WRIST FRACTURE [place]         Social History:  reports that she has quit smoking. She has quit using smokeless tobacco. She reports current alcohol use of about 2.0 standard drinks per week. She reports that she does not use drugs.     Patient Care Team:  Kaitlin Topete MD as PCP - General (Family Medicine)  Kait[facility]ete [facility]    Problem List  Date Reviewed: 6/28/2022            Codes Class Noted    Acquired hypothyroidism ICD-10-CM: E03.9  ICD-9-CM: 244.9  7/13/2022        Hypercholesterolemia ICD-10-CM: E78.00  ICD-9-CM: 272.0  6/7/2022        Vitamin D insufficiency ICD-10-CM: E55.9  ICD-9-CM: 268.9  6/7/2022        Prediabetes ICD-10-CM: R73.03  ICD-9-CM: 790.29  6/7/2022        Chronic bronchitis ([facility] ICD-10-CM: J42  ICD-9-CM: 491.9  1/8/2020        Dysthymic ICD-10-CM: F34.1  ICD-9-CM: 300.4  12/24/2019        Psoriasis ICD-10-CM: L40.9  ICD-9-CM: 696.1  12/24/2019        Osteoarthritis of lumbar spine ICD-10-CM: M47.816  ICD-9-CM: 721.3  12/24/2019                I ADVISED PATIENT TO GO TO ER IF SYMPTOMS WORSEN , CHANGE OR FAILS TO IMPROVE. I have discussed the diagnosis with the patient and the intended plan as seen in the above orders. The patient has received an after-visit summary and questions were answered concerning future plans. I have discussed medication side effects and warnings with the patient as well. The patient agrees and understands above plan. An electronic signature was used to authenticate this note.   -- Lis Garcia MD

## 2022-07-25 NOTE — PROGRESS NOTES
1. Have you been to the ER, urgent care clinic since your last visit? Hospitalized since your last visit? No    2. Have you seen or consulted any other health care providers outside of the [address] [place] [address] since your last visit? Include any pap smears or colon screening.  No      Chief Complaint   Patient presents with    Follow-up     Read labs

## 2022-08-22 DIAGNOSIS — G89.29 CHRONIC LEFT SHOULDER PAIN: ICD-10-CM

## 2022-08-22 DIAGNOSIS — M25.512 CHRONIC LEFT SHOULDER PAIN: ICD-10-CM

## 2022-08-22 RX ORDER — DICLOFENAC SODIUM 75 MG/1
75 TABLET, DELAYED RELEASE ORAL
Qty: 60 TABLET | Refills: 1 | Status: SHIPPED | OUTPATIENT
Start: 2022-08-22

## 2022-08-22 RX ORDER — DICYCLOMINE HYDROCHLORIDE 10 MG/1
CAPSULE ORAL
Qty: 30 CAPSULE | Refills: 0 | Status: SHIPPED | OUTPATIENT
Start: 2022-08-22

## 2022-08-22 NOTE — TELEPHONE ENCOUNTER
Please advise!!    Patient called in stating that she needs her Dicyclomine 10 mg needs to be refilled due to she has IBS and cramping in her stomach. .PCP: Mayi Bourgeois MD    Last appt: 7/25/2022  No future appointments. Requested Prescriptions     Pending Prescriptions Disp Refills    diclofenac EC (VOLTAREN) 75 mg EC tablet 60 Tablet 1     Sig: Take 1 Tablet by mouth two (2) times daily as needed for Pain.     dicyclomine (BENTYL) 10 mg capsule         Prior labs and Blood pressures:  BP Readings from Last 3 Encounters:   07/25/22 116/80   07/13/22 115/75   06/28/22 114/77     Lab Results   Component Value Date/Time    Sodium 143 07/13/2022 04:11 PM    Potassium 4.5 07/13/2022 04:11 PM    Chloride 111 (H) 07/13/2022 04:11 PM    CO2 27 07/13/2022 04:11 PM    Anion gap 5 07/13/2022 04:11 PM    Glucose 103 (H) 07/13/2022 04:11 PM    BUN 23 (H) 07/13/2022 04:11 PM    Creatinine 0.92 07/13/2022 04:11 PM    BUN/Creatinine ratio 25 (H) 07/13/2022 04:11 PM    GFR est AA >60 07/13/2022 04:11 PM    GFR est non-AA >60 07/13/2022 04:11 PM    Calcium 9.9 07/13/2022 04:11 PM     Lab Results   Component Value Date/Time    Hemoglobin A1c 6.0 (H) 07/21/2021 12:00 AM     Lab Results   Component Value Date/Time    Cholesterol, total 233 (H) 05/27/2022 08:57 AM    HDL Cholesterol 56 05/27/2022 08:57 AM    LDL, calculated 150 (H) 05/27/2022 08:57 AM    LDL, calculated 126.6 (H) 02/26/2021 12:18 PM    VLDL, calculated 27 05/27/2022 08:57 AM    VLDL, calculated 33.4 02/26/2021 12:18 PM    Triglyceride 153 (H) 05/27/2022 08:57 AM    CHOL/HDL Ratio 3.3 02/26/2021 12:18 PM     Lab Results   Component Value Date/Time    VITAMIN D, 25-HYDROXY 21.0 (L) 05/27/2022 08:57 AM       Lab Results   Component Value Date/Time    TSH 2.98 07/13/2022 04:11 PM

## 2022-09-06 ENCOUNTER — TRANSCRIBE ORDER (OUTPATIENT)
Dept: SCHEDULING | Age: 67
End: 2022-09-06

## 2022-09-06 DIAGNOSIS — M54.16 LUMBAR RADICULOPATHY: Primary | ICD-10-CM

## 2022-09-07 ENCOUNTER — HOSPITAL ENCOUNTER (OUTPATIENT)
Dept: MRI IMAGING | Age: 67
Discharge: HOME | End: 2022-09-07
Attending: ORTHOPAEDIC SURGERY
Payer: MEDICARE

## 2022-09-07 DIAGNOSIS — M54.16 LUMBAR RADICULOPATHY: ICD-10-CM

## 2022-09-07 PROCEDURE — 72148 MRI LUMBAR SPINE W/O DYE: CPT

## 2022-09-21 NOTE — PROGRESS NOTES
Patient stated name &      Health Maintenance Due   Topic    Hepatitis C Screening     Lipid Screen     Shingrix Vaccine Age 50> (1 of 2)    Breast Cancer Screen Mammogram     GLAUCOMA SCREENING Q2Y     Bone Densitometry (Dexa) Screening     Pneumococcal 65+ years (2 of 2 - PPSV23)    Medicare Yearly Exam        Wt Readings from Last 3 Encounters:   20 202 lb (91.6 kg)   19 202 lb (91.6 kg)     Temp Readings from Last 3 Encounters:   19 97.5 °F (36.4 °C) (Oral)     BP Readings from Last 3 Encounters:   19 123/81     Pulse Readings from Last 3 Encounters:   19 83         Learning Assessment:  :     No flowsheet data found. Depression Screening:  :     3 most recent PHQ Screens 2019   Little interest or pleasure in doing things Not at all   Feeling down, depressed, irritable, or hopeless Not at all   Total Score PHQ 2 0       Fall Risk Assessment:  :     Fall Risk Assessment, last 12 mths 2020   Able to walk? Yes   Fall in past 12 months? Yes   Fall with injury? No   Number of falls in past 12 months 1   Fall Risk Score 1       Abuse Screening:  :     No flowsheet data found. Coordination of Care Questionnaire:  :     1) Have you been to an emergency room, urgent care clinic since your last visit? No  Hospitalized since your last visit? no             2) Have you seen or consulted any other health care providers outside of [address] [place] [address] since your last visit? No  (Include any pap smears or colon screenings in this section.)    Patient is accompanied by virtual visit I have received verbal consent from Angel Woodson to discuss any/all medical information while they are present in the room. Please advise lab orders.   Patient has a lab appointment tomorrow

## 2022-10-09 DIAGNOSIS — F33.42 RECURRENT MAJOR DEPRESSIVE DISORDER, IN FULL REMISSION (HCC): ICD-10-CM

## 2022-10-10 DIAGNOSIS — K21.00 GASTROESOPHAGEAL REFLUX DISEASE WITH ESOPHAGITIS WITHOUT HEMORRHAGE: ICD-10-CM

## 2022-10-10 RX ORDER — BUPROPION HYDROCHLORIDE 150 MG/1
TABLET, EXTENDED RELEASE ORAL
Qty: 180 TABLET | Refills: 0 | Status: SHIPPED | OUTPATIENT
Start: 2022-10-10

## 2022-10-10 NOTE — TELEPHONE ENCOUNTER
PCP: Marcos Kaye MD    Last appt: 7/25/2022  Future Appointments   Date Time Provider Alyce Crabtreei   11/14/2022  5:00 PM [facility]       Requested Prescriptions     Pending Prescriptions Disp Refills    famotidine (PEPCID) 40 mg tablet 90 Tablet 5     Sig: Take 1 Tablet by mouth daily.        Prior labs and Blood pressures:  BP Readings from Last 3 Encounters:   07/25/22 116/80   07/13/22 115/75   06/28/22 114/77     Lab Results   Component Value Date/Time    Sodium 143 07/13/2022 04:11 PM    Potassium 4.5 07/13/2022 04:11 PM    Chloride 111 (H) 07/13/2022 04:11 PM    CO2 27 07/13/2022 04:11 PM    Anion gap 5 07/13/2022 04:11 PM    Glucose 103 (H) 07/13/2022 04:11 PM    BUN 23 (H) 07/13/2022 04:11 PM    Creatinine 0.92 07/13/2022 04:11 PM    BUN/Creatinine ratio 25 (H) 07/13/2022 04:11 PM    GFR est AA >60 07/13/2022 04:11 PM    GFR est non-AA >60 07/13/2022 04:11 PM    Calcium 9.9 07/13/2022 04:11 PM     Lab Results   Component Value Date/Time    Hemoglobin A1c 6.0 (H) 07/21/2021 12:00 AM     Lab Results   Component Value Date/Time    Cholesterol, total 233 (H) 05/27/2022 08:57 AM    HDL Cholesterol 56 05/27/2022 08:57 AM    LDL, calculated 150 (H) 05/27/2022 08:57 AM    LDL, calculated 126.6 (H) 02/26/2021 12:18 PM    VLDL, calculated 27 05/27/2022 08:57 AM    VLDL, calculated 33.4 02/26/2021 12:18 PM    Triglyceride 153 (H) 05/27/2022 08:57 AM    CHOL/HDL Ratio 3.3 02/26/2021 12:18 PM     Lab Results   Component Value Date/Time    VITAMIN D, 25-HYDROXY 21.0 (L) 05/27/2022 08:57 AM       Lab Results   Component Value Date/Time    TSH 2.98 07/13/2022 04:11 PM

## 2022-10-17 RX ORDER — FAMOTIDINE 40 MG/1
40 TABLET, FILM COATED ORAL DAILY
Qty: 90 TABLET | Refills: 5 | Status: SHIPPED | OUTPATIENT
Start: 2022-10-17

## 2022-11-14 ENCOUNTER — HOSPITAL ENCOUNTER (OUTPATIENT)
Dept: MAMMOGRAPHY | Age: 67
Discharge: HOME | End: 2022-11-14
Attending: FAMILY MEDICINE
Payer: MEDICARE

## 2022-11-14 DIAGNOSIS — Z12.31 ENCOUNTER FOR SCREENING MAMMOGRAM FOR MALIGNANT NEOPLASM OF BREAST: ICD-10-CM

## 2022-11-14 PROCEDURE — 77067 SCR MAMMO BI INCL CAD: CPT

## 2022-12-07 NOTE — PROGRESS NOTES
Susana Em  76 y.o. female  1955  [facility]:[id number]    [facility] FAMILY MEDICINE  Progress Note     Encounter Date: 6/30/2020    Susana Em is a 76 y.o. female who was seen by synchronous (real-time) audio-video technology on 6/30/2020. She and/or her healthcare decision maker is aware that this patient-initiated Telehealth encounter is a billable service, with coverage as determined by her insurance carrier. She  is aware that she may receive a bill and has provided verbal consent to proceed:Yes    I was at home while conducting this encounter. The patient was checked in/\"roomed\" by Jose Angel Purcell CMA via telephone in the office. The patient was at home during the encounter. This visit was completed virtually using D[employer]. me  Assessment and Plan:     Encounter Diagnoses     ICD-10-CM ICD-9-CM   1. Osteoarthritis of lumbar spine, unspecified spinal osteoarthritis complication status Y06.550 721.3       1. Osteoarthritis of lumbar spine, unspecified spinal osteoarthritis complication status  Advised patient that prednisone is not a long term medication due to side effects. She will continue robaxin at 1/2 tablet due to fogginess caused by medication. Referral to chiropractic and second opinion to orthopedic surgeon placed. - REFERRAL TO CHIROPRACTIC  - predniSONE (DELTASONE) 20 mg tablet; Take 40 mg by mouth daily (with breakfast) for 5 days. Dispense: 10 Tab; Refill: 0  - REFERRAL TO ORTHOPEDIC SURGERY      We discussed the expected course, resolution and complications of the diagnosis(es) in detail. Medication risks, benefits, costs, interactions, and alternatives were discussed as indicated. I advised her to contact the office if her condition worsens, changes or fails to improve as anticipated. She expressed understanding with the diagnosis(es) and plan.      CPT Codes 43952-56504 for Established Patients may apply to this Telehealth Visit      Pursuant to the emergency declaration under the [facility], [facility] waiver authority and the [employer] and [employer] Act, this Virtual  Visit was conducted, with patient's consent, to reduce the patient's risk of exposure to COVID-19 and provide continuity of care for an established patient. Services were provided through a video synchronous discussion virtually to substitute for in-person clinic visit. Electronically Signed: Tressa Sheikh MD    Current Medications after this visit     Current Outpatient Medications   Medication Sig    methocarbamoL (ROBAXIN) 750 mg tablet Take 750 mg by mouth.  predniSONE (DELTASONE) 20 mg tablet Take 40 mg by mouth daily (with breakfast) for 5 days.  pantoprazole (PROTONIX) 40 mg tablet Take 1 Tab by mouth daily.  buPROPion SR (WELLBUTRIN SR) 150 mg SR tablet Take 1 Tab by mouth two (2) times a day.  betamethasone dipropionate (DIPROLENE) 0.05 % ointment APPLY OINTMENT TOPICALLY TWICE DAILY TO KNEES. ALTERNATE WITH CALCIPOTRIENE    cetirizine (ZYRTEC) 10 mg tablet Take  by mouth.  simethicone (GAS RELIEF PO) Take  by mouth.  GUSELKUMAB  mg by SubCUTAneous route every two (2) months.  cyclobenzaprine (AMRIX) 15 mg cp24 SR capsule Take 1 Cap by mouth daily as needed for Muscle Spasm(s).  Lactobacillus acidophilus (PROBIOTIC PO) Take  by mouth. No current facility-administered medications for this visit. Medications Discontinued During This Encounter   Medication Reason    ibuprofen (MOTRIN) 800 mg tablet Not A Current Medication     ~~~~~~~~~~~~~~~~~~~~~~~~~~~~~~~~~~~~~~~~~~~~~~    Chief Complaint   Patient presents with    Medication Evaluation    Other     Pt requesting a referral for Chiropractor        History of Present Illness   Jono Serrano is a 76 y.o. female who presents for:    Back pain  Patient present with cc of back pain.  Patient reports that she has been [de-identified] : Bilateral knees\par \par Constitutional: \par The patient is healthy-appearing and in no apparent distress. \par \par Gait:\par The patient ambulates with a normal gait and no limp.\par \par Cardiovascular System: \par The capillary refill is less than 2 seconds. \par \par Skin: \par There are no skin abnormalities.\par \par Bilateral Knee:\par  \par Bony Palpation: \par There is tenderness of the medial joint line. \par There is no tenderness of the lateral joint line.\par There is tenderness of the medial femoral chondyle.\par There is no tenderness of the lateral femoral chondyle.\par There is no tenderness of the tibial tubercle.\par There is no tenderness of the superior patella.\par There is no tenderness of the inferior patella.\par There is no tenderness of the medial patellar facet.\par There is tenderness of the lateral patellar facet.\par \par Soft Tissue Palpation: \par There is no tenderness of the medial retinaculum.\par There is no tenderness of the lateral retinaculum.\par There is no tenderness of the quadriceps tendon.\par There is no tenderness of the patella tendon.\par There is no tenderness of the ITB.\par There is no tenderness of the pes anserine.\par \par Active Range of Motion: \par The range of motion at the knee actively and passively is full. \par \par Special Tests: \par There is a negative Apley.\par There is a negative Steinmanns. \par There is a negative Lachman and Anterior Drawer.\par There is a negative Posterior Drawer.  \par There is no varus or valgus laxity.\par \par Strength: \par There is 5/5 hip flexion and 5/5 knee flexion and extension.  \par \par Psychiatric: \par The patient demonstrates a normal mood and affect and is active and alert\par \par  followed by pain and orthopedic surgeon. Dr. Makeda Díaz has been following and has been treating her with tramadol and gabapentin (discontinued due to side effects). At prior appointments, trial of lyrica was prescribed but had severe dizziness.  -Patient has had spinal injections due reports minimal relief. She was offer surgical options for intervention but declined and who also like to seek a second opinion as well as chiropractic advice. Review of Systems   Review of Systems   Constitutional: Negative for chills and fever. HENT: Negative for congestion, ear discharge and sore throat. Eyes: Negative for double vision, photophobia and discharge. Respiratory: Negative for cough, sputum production, shortness of breath and wheezing. Cardiovascular: Negative for chest pain, palpitations and leg swelling. Gastrointestinal: Negative for diarrhea, nausea and vomiting. Genitourinary: Negative for dysuria and urgency. Musculoskeletal: Positive for back pain and myalgias. Negative for falls and neck pain. Skin: Negative. Neurological: Negative for dizziness, tremors and headaches. Vitals/Objective:     Due to this being a TeleHealth evaluation, many elements of the physical examination are unable to be assessed. Physical Exam  Constitutional:       General: She is not in acute distress. Appearance: Normal appearance. She is obese. She is not ill-appearing, toxic-appearing or diaphoretic. HENT:      Head: Normocephalic and atraumatic. Right Ear: External ear normal.      Left Ear: External ear normal.      Mouth/Throat:      Mouth: Mucous membranes are moist.   Eyes:      General:         Right eye: No discharge. Left eye: No discharge. Extraocular Movements: Extraocular movements intact. Conjunctiva/sclera: Conjunctivae normal.   Neck:      Musculoskeletal: Normal range of motion.    Pulmonary:      Effort: Pulmonary effort is normal.      Comments: No visualized signs of difficulty breathing or respiratory distress  Skin:     Coloration: Skin is not pale. Findings: No erythema or rash. Neurological:      Mental Status: She is alert and oriented to person, place, and time. Cranial Nerves: No cranial nerve deficit ( No Facial Asymmetry (Cranial nerve 7 motor function) (limited exam due to video visit) ). Comments: Able to follow commands    Psychiatric:         Mood and Affect: Mood normal.         Behavior: Behavior normal.          No results found for this or any previous visit (from the past 24 hour(s)). Disposition     No future appointments. History   Patient's past medical, surgical and family histories were reviewed and updated. Past Medical History:   Diagnosis Date    DJD (degenerative joint disease)     Osteoarthritis of lumbar spine 12/24/2019    Psoriasis     Recurrent major depressive disorder, in full remission ([facility] [address] [address] 12/24/2019     Past Surgical History:   Procedure Laterality Date    HX MYOMECTOMY      HX WRIST FRACTURE [place]       Family History   Problem Relation Age of Onset    Hypertension Mother     Heart Attack Father      Social History     Tobacco Use    Smoking status: Former Smoker    Smokeless tobacco: Former User   Substance Use Topics    Alcohol use:  Yes     Alcohol/week: 2.0 standard drinks     Types: 2 Cans of beer per week     Comment: ocassionally    Drug use: Never       Allergies     Allergies   Allergen Reactions    Gabapentin Palpitations    Procaine Other (comments)     Had a shot in mouth and felt like eye was going to pop out of her head

## 2022-12-19 DIAGNOSIS — M25.512 CHRONIC LEFT SHOULDER PAIN: ICD-10-CM

## 2022-12-19 DIAGNOSIS — G89.29 CHRONIC LEFT SHOULDER PAIN: ICD-10-CM

## 2022-12-19 RX ORDER — DICLOFENAC SODIUM 75 MG/1
75 TABLET, DELAYED RELEASE ORAL
Qty: 60 TABLET | Refills: 1 | Status: SHIPPED | OUTPATIENT
Start: 2022-12-19 | End: 2022-12-20

## 2022-12-19 NOTE — TELEPHONE ENCOUNTER
Pt run out of me but has apt tomorrow. Can we help.   PCP: Arron Myers MD    Last appt: 7/25/2022  Future Appointments   Date Time Provider Alyce Annie   12/20/2022  9:00 AM Brandon Harris NP [facility]       Requested Prescriptions      No prescriptions requested or ordered in this encounter       Prior labs and Blood pressures:  BP Readings from Last 3 Encounters:   07/25/22 116/80   07/13/22 115/75   06/28/22 114/77     Lab Results   Component Value Date/Time    Sodium 143 07/13/2022 04:11 PM    Potassium 4.5 07/13/2022 04:11 PM    Chloride 111 (H) 07/13/2022 04:11 PM    CO2 27 07/13/2022 04:11 PM    Anion gap 5 07/13/2022 04:11 PM    Glucose 103 (H) 07/13/2022 04:11 PM    BUN 23 (H) 07/13/2022 04:11 PM    Creatinine 0.92 07/13/2022 04:11 PM    BUN/Creatinine ratio 25 (H) 07/13/2022 04:11 PM    GFR est AA >60 07/13/2022 04:11 PM    GFR est non-AA >60 07/13/2022 04:11 PM    Calcium 9.9 07/13/2022 04:11 PM     Lab Results   Component Value Date/Time    Hemoglobin A1c 6.0 (H) 07/21/2021 12:00 AM     Lab Results   Component Value Date/Time    Cholesterol, total 233 (H) 05/27/2022 08:57 AM    HDL Cholesterol 56 05/27/2022 08:57 AM    LDL, calculated 150 (H) 05/27/2022 08:57 AM    LDL, calculated 126.6 (H) 02/26/2021 12:18 PM    VLDL, calculated 27 05/27/2022 08:57 AM    VLDL, calculated 33.4 02/26/2021 12:18 PM    Triglyceride 153 (H) 05/27/2022 08:57 AM    CHOL/HDL Ratio 3.3 02/26/2021 12:18 PM     Lab Results   Component Value Date/Time    VITAMIN D, 25-HYDROXY 21.0 (L) 05/27/2022 08:57 AM       Lab Results   Component Value Date/Time    TSH 2.98 07/13/2022 04:11 PM

## 2022-12-20 ENCOUNTER — OFFICE VISIT (OUTPATIENT)
Dept: FAMILY MEDICINE CLINIC | Age: 67
End: 2022-12-20
Payer: MEDICARE

## 2022-12-20 VITALS
WEIGHT: 203 LBS | TEMPERATURE: 97.3 F | HEIGHT: 62 IN | HEART RATE: 81 BPM | RESPIRATION RATE: 16 BRPM | DIASTOLIC BLOOD PRESSURE: 76 MMHG | SYSTOLIC BLOOD PRESSURE: 113 MMHG | OXYGEN SATURATION: 99 % | BODY MASS INDEX: 37.36 KG/M2

## 2022-12-20 DIAGNOSIS — G89.29 CHRONIC LEFT SHOULDER PAIN: ICD-10-CM

## 2022-12-20 DIAGNOSIS — N39.0 URINARY TRACT INFECTION WITHOUT HEMATURIA, SITE UNSPECIFIED: Primary | ICD-10-CM

## 2022-12-20 DIAGNOSIS — B37.31 VAGINAL CANDIDA: ICD-10-CM

## 2022-12-20 DIAGNOSIS — M25.512 CHRONIC LEFT SHOULDER PAIN: ICD-10-CM

## 2022-12-20 DIAGNOSIS — M47.816 OSTEOARTHRITIS OF LUMBAR SPINE, UNSPECIFIED SPINAL OSTEOARTHRITIS COMPLICATION STATUS: ICD-10-CM

## 2022-12-20 LAB
BILIRUB UR QL STRIP: NEGATIVE
GLUCOSE UR-MCNC: NEGATIVE MG/DL
KETONES P FAST UR STRIP-MCNC: NEGATIVE MG/DL
PH UR STRIP: 5.5 [PH] (ref 4.6–8)
PROT UR QL STRIP: NORMAL
SP GR UR STRIP: 1.03 (ref 1–1.03)
UA UROBILINOGEN AMB POC: NORMAL (ref 0.2–1)
URINALYSIS CLARITY POC: NORMAL
URINALYSIS COLOR POC: YELLOW
URINE BLOOD POC: NEGATIVE
URINE LEUKOCYTES POC: NORMAL
URINE NITRITES POC: NEGATIVE

## 2022-12-20 PROCEDURE — 81002 URINALYSIS NONAUTO W/O SCOPE: CPT | Performed by: NURSE PRACTITIONER

## 2022-12-20 PROCEDURE — 99214 OFFICE O/P EST MOD 30 MIN: CPT | Performed by: NURSE PRACTITIONER

## 2022-12-20 PROCEDURE — 1123F ACP DISCUSS/DSCN MKR DOCD: CPT | Performed by: NURSE PRACTITIONER

## 2022-12-20 RX ORDER — DICLOFENAC SODIUM 75 MG/1
TABLET, DELAYED RELEASE ORAL
Qty: 60 TABLET | Refills: 1 | Status: SHIPPED | OUTPATIENT
Start: 2022-12-20

## 2022-12-20 RX ORDER — FLUCONAZOLE 150 MG/1
150 TABLET ORAL DAILY
Qty: 1 TABLET | Refills: 0 | Status: SHIPPED | OUTPATIENT
Start: 2022-12-20 | End: 2022-12-21

## 2022-12-20 RX ORDER — DICLOFENAC SODIUM 75 MG/1
75 TABLET, DELAYED RELEASE ORAL DAILY
Qty: 90 TABLET | Refills: 0 | Status: SHIPPED | OUTPATIENT
Start: 2022-12-20

## 2022-12-20 RX ORDER — NITROFURANTOIN 25; 75 MG/1; MG/1
100 CAPSULE ORAL 2 TIMES DAILY
Qty: 10 CAPSULE | Refills: 0 | Status: SHIPPED | OUTPATIENT
Start: 2022-12-20 | End: 2022-12-25

## 2022-12-20 NOTE — PROGRESS NOTES
1. Have you been to the ER, urgent care clinic since your last visit? Hospitalized since your last visit? No    2. Have you seen or consulted any other health care providers outside of the [address] [place] [address] since your last visit? Include any pap smears or colon screening. No    Chief Complaint   Patient presents with    UTI    Medication Refill     Health Maintenance Due   Topic Date Due    COVID-19 Vaccine (1) Never done    Shingrix Vaccine Age 50> (1 of 2) Never done    Pneumococcal 65+ years (2 - PPSV23 if available, else PCV20) 11/21/2015    Bone Densitometry (Dexa) Screening  Never done    A1C test (Diabetic or Prediabetic)  07/21/2022    Flu Vaccine (1) 08/01/2022     3 most recent PHQ Screens 12/20/2022   Little interest or pleasure in doing things Not at all   Feeling down, depressed, irritable, or hopeless Not at all   Total Score PHQ 2 0     Abuse Screening Questionnaire 12/20/2022   Do you ever feel afraid of your partner? N   Are you in a relationship with someone who physically or mentally threatens you? N   Is it safe for you to go home?  Y     Visit Vitals  /76 (BP 1 Location: Left upper arm, BP Patient Position: Sitting, BP Cuff Size: Adult)   Pulse 81   Temp 97.3 °F (36.3 °C) (Skin)   Resp 16   Ht 5' 2\" (1.575 m)   Wt 203 lb (92.1 kg)   SpO2 99%   BMI 37.13 kg/m²

## 2022-12-20 NOTE — PROGRESS NOTES
Assessment/Plan:     Diagnoses and all orders for this visit:    1. Urinary tract infection without hematuria, site unspecified  -     URINALYSIS W/ REFLEX CULTURE; Future  -     nitrofurantoin, macrocrystal-monohydrate, (Macrobid) 100 mg capsule; Take 1 Capsule by mouth two (2) times a day for 5 days. Indications: urinary tract infection due to E. coli bacteria  -     AMB POC URINALYSIS DIP STICK MANUAL W/O MICRO  Reports 2 to 3 weeks of burning and increased frequency of urination. Reports darkening color of urine with odor. Urine dip was performed positive leukocytes. Culture will be sent. Antibiotic prescribed. 2. Vaginal candida  -     fluconazole (DIFLUCAN) 150 mg tablet; Take 1 Tablet by mouth daily for 1 day. FDA advises cautious prescribing of oral fluconazole in pregnancy. Patient reports getting vaginal yeast infections with antibiotics Diflucan has been prescribed. 3. Osteoarthritis of lumbar spine, unspecified spinal osteoarthritis complication status  -     diclofenac EC (VOLTAREN) 75 mg EC tablet; Take 1 Tablet by mouth daily. Indications: joint damage causing pain and loss of function  History of lumbar spine pain with surgeries. Requesting refill of Voltaren. Denies any GERD symptoms at this time. Discussed expected course/resolution/complications of diagnosis in detail with patient. Medication risks/benefits/costs/interactions/alternatives discussed with patient. Pt was given after visit summary which includes diagnoses, current medications & vitals. Pt expressed understanding with the diagnosis and plan        Subjective:      Ameena Reed is a 75 y.o. female who presents for had concerns including UTI and Medication Refill. Current Outpatient Medications   Medication Sig Dispense Refill    diclofenac EC (VOLTAREN) 75 mg EC tablet Take 1 Tablet by mouth daily.  Indications: joint damage causing pain and loss of function 90 Tablet 0    nitrofurantoin, macrocrystal-monohydrate, (Macrobid) 100 mg capsule Take 1 Capsule by mouth two (2) times a day for 5 days. Indications: urinary tract infection due to E. coli bacteria 10 Capsule 0    fluconazole (DIFLUCAN) 150 mg tablet Take 1 Tablet by mouth daily for 1 day. FDA advises cautious prescribing of oral fluconazole in pregnancy. 1 Tablet 0    diclofenac EC (VOLTAREN) 75 mg EC tablet Take 1 Tablet by mouth two (2) times daily as needed for Pain. 60 Tablet 1    famotidine (PEPCID) 40 mg tablet Take 1 Tablet by mouth daily. 90 Tablet 5    buPROPion SR (WELLBUTRIN SR) 150 mg SR tablet TAKE 1 TABLET BY MOUTH TWICE DAILY 180 Tablet 0    dicyclomine (BENTYL) 10 mg capsule TAKE 1 CAPSULE BY MOUTH EVERY 6 HOURS AS NEEDED 30 Capsule 0    albuterol (PROVENTIL HFA, VENTOLIN HFA, PROAIR HFA) 90 mcg/actuation inhaler Take 1 Puff by inhalation every six (6) hours as needed for Wheezing. 18 g 5    levothyroxine (SYNTHROID) 50 mcg tablet Take 1 Tablet by mouth Daily (before breakfast). 90 Tablet 3    ergocalciferol (ERGOCALCIFEROL) 1,250 mcg (50,000 unit) capsule Take 1 Capsule by mouth every seven (7) days. Indications: vitamin D deficiency (high dose therapy) 12 Capsule 5    rosuvastatin (CRESTOR) 10 mg tablet Take 1 Tablet by mouth nightly. 90 Tablet 2    pantoprazole (PROTONIX) 40 mg tablet TAKE 1 TABLET BY MOUTH TWICE DAILY 180 Tablet 1    guselkumab (TREMFYA SC) by SubCUTAneous route. 1 injection every other month. Prescribed by Dermatology.  Lactobacillus acidophilus (PROBIOTIC PO) Take  by mouth.  cetirizine (ZYRTEC) 10 mg tablet Take  by mouth.  simethicone (GAS RELIEF PO) Take  by mouth.          Allergies   Allergen Reactions    Penicillins Shortness of Breath     Unknown reaction, but remembered she had one    Gabapentin Palpitations    Procaine Other (comments)     Had a shot in mouth and felt like eye was going to pop out of her head     Past Medical History:   Diagnosis Date    DJD (degenerative joint disease)     Osteoarthritis of lumbar spine 12/24/2019    Psoriasis     [facility]2019     Past Surgical History:   Procedure Laterality Date    HX MYOMECTOMY      HX WRIST FRACTURE TX       Family History   Problem Relation Age of Onset    Hypertension Mother     Heart Attack Father     Other Sister         anuersym    Hypertension Sister     Other Brother         brain tumor    Breast Cancer Maternal Aunt      Social History     Socioeconomic History    Marital status:      Spouse name: Not on file    Number of children: Not on file    Years of education: Not on file    Highest education level: Not on file   Occupational History    Not on file   Tobacco Use    Smoking status: Former    Smokeless tobacco: Former   Substance and Sexual Activity    Alcohol use: Yes     Alcohol/week: 2.0 standard drinks     Types: 2 Cans of beer per week     Comment: ocassionally    Drug use: Never    Sexual activity: Not Currently   Other Topics Concern    Not on file   Social History Narrative    Not on file     Social Determinants of Health     Financial Resource Strain: Not on file   Food Insecurity: Not on file   Transportation Needs: Not on file   Physical Activity: Not on file   Stress: Not on file   Social Connections: Not on file   Intimate Partner Violence: Not on file   Housing Stability: Not on file       HPI      ROS:   Review of Systems   Constitutional:  Negative for fever and malaise/fatigue. HENT:  Negative for congestion, sinus pain and sore throat. Respiratory:  Negative for cough and shortness of breath. Cardiovascular:  Negative for chest pain. Gastrointestinal:  Negative for diarrhea, nausea and vomiting. Genitourinary:  Positive for dysuria, frequency and urgency. Musculoskeletal:  Positive for back pain. Neurological:  Negative for dizziness and headaches.    Endo/Heme/Allergies:  Negative for environmental allergies. Psychiatric/Behavioral: Negative. Objective:   Visit Vitals  /76 (BP 1 Location: Left upper arm, BP Patient Position: Sitting, BP Cuff Size: Adult)   Pulse 81   Temp 97.3 °F (36.3 °C) (Skin)   Resp 16   Ht 5' 2\" (1.575 m)   Wt 203 lb (92.1 kg)   SpO2 99%   BMI 37.13 kg/m²         Vitals and Nurse Documentation reviewed. Physical Exam  Vitals and nursing note reviewed. Constitutional:       General: She is not in acute distress. Appearance: Normal appearance. HENT:      Head: Normocephalic and atraumatic. Nose: Nose normal.      Mouth/Throat:      Mouth: Mucous membranes are moist.   Eyes:      Pupils: Pupils are equal, round, and reactive to light. Cardiovascular:      Rate and Rhythm: Normal rate and regular rhythm. Pulses: Normal pulses. Heart sounds: Normal heart sounds. Pulmonary:      Effort: Pulmonary effort is normal.      Breath sounds: Normal breath sounds. Abdominal:      General: Abdomen is flat. Musculoskeletal:         General: Normal range of motion. Cervical back: Normal range of motion. Skin:     General: Skin is warm and dry. Capillary Refill: Capillary refill takes less than 2 seconds. Neurological:      General: No focal deficit present. Mental Status: She is alert and oriented to person, place, and time. Mental status is at baseline.    Psychiatric:         Mood and Affect: Mood normal.         Behavior: Behavior normal.     Results for orders placed or performed in visit on 12/20/22   AMB POC URINALYSIS DIP STICK MANUAL W/O MICRO   Result Value Ref Range    Color (UA POC) Yellow     Clarity (UA POC) Slightly Cloudy     Glucose (UA POC) Negative Negative    Bilirubin (UA POC) Negative Negative    Ketones (UA POC) Negative Negative    Specific gravity (UA POC) 1.030 1.001 - 1.035    Blood (UA POC) Negative Negative    pH (UA POC) 5.5 4.6 - 8.0    Protein (UA POC) Trace Negative    Urobilinogen (UA POC) 0.2 mg/dL 0.2 - 1    Nitrites (UA POC) Negative Negative    Leukocyte esterase (UA POC) Trace Negative

## 2022-12-23 DIAGNOSIS — E03.9 ACQUIRED HYPOTHYROIDISM: Primary | ICD-10-CM

## 2022-12-23 RX ORDER — LEVOTHYROXINE SODIUM 75 UG/1
75 TABLET ORAL
Qty: 90 TABLET | Refills: 1 | Status: SHIPPED | OUTPATIENT
Start: 2022-12-23

## 2022-12-24 LAB
APPEARANCE UR: ABNORMAL
BACTERIA #/AREA URNS HPF: ABNORMAL /[HPF]
BACTERIA UR CULT: NO GROWTH
BILIRUB UR QL STRIP: NEGATIVE
CASTS URNS QL MICRO: ABNORMAL /LPF
COLOR UR: YELLOW
EPI CELLS #/AREA URNS HPF: >10 /HPF (ref 0–10)
GLUCOSE UR QL STRIP: NEGATIVE
HGB UR QL STRIP: ABNORMAL
KETONES UR QL STRIP: NEGATIVE
LEUKOCYTE ESTERASE UR QL STRIP: ABNORMAL
MICRO URNS: ABNORMAL
NITRITE UR QL STRIP: POSITIVE
PH UR STRIP: 5 [PH] (ref 5–7.5)
PROT UR QL STRIP: ABNORMAL
RBC #/AREA URNS HPF: ABNORMAL /HPF (ref 0–2)
SP GR UR STRIP: 1.03 (ref 1–1.03)
URINALYSIS REFLEX, 377202: ABNORMAL
UROBILINOGEN UR STRIP-MCNC: 0.2 MG/DL (ref 0.2–1)
WBC #/AREA URNS HPF: ABNORMAL /HPF (ref 0–5)

## 2022-12-24 NOTE — PROGRESS NOTES
Please call inform patient, TSH is elevated. I have increased dose of her thyroid medication. New prescription faxed to your pharmacy. Recheck thyroid labs in 6 weeks.   Thanks

## 2022-12-27 DIAGNOSIS — E03.9 ACQUIRED HYPOTHYROIDISM: Primary | ICD-10-CM

## 2022-12-27 RX ORDER — LEVOTHYROXINE SODIUM 75 UG/1
75 TABLET ORAL
Qty: 90 TABLET | Refills: 0 | Status: SHIPPED | OUTPATIENT
Start: 2022-12-27

## 2022-12-27 NOTE — PROGRESS NOTES
Dr. Marco A Aviles,    Dr. Steven Hastings is out this week. Ms. Pandageno Coco states that the Levothyroxine causes cramps and would like an alternative.     kasie

## 2022-12-29 ENCOUNTER — TELEPHONE (OUTPATIENT)
Dept: FAMILY MEDICINE CLINIC | Age: 67
End: 2022-12-29

## 2022-12-29 NOTE — TELEPHONE ENCOUNTER
Updated report received . . after viewing report per  Dr. Phan Jacob  patient is taking the correct antibiotic medication

## 2022-12-29 NOTE — TELEPHONE ENCOUNTER
Two patient Identification confirmed. Patient notified increase in dose changed by Dr. Megan Benites due to [facility] being elevated. Patient verbalized understanding.

## 2022-12-29 NOTE — TELEPHONE ENCOUNTER
Per Charles Marie Urine C&S resulted from 12/20/22 was no growth, that was a incorrect report,  urine had some growth. they have corrected the report and will be faxing the results over today.

## 2023-02-02 DIAGNOSIS — F33.42 RECURRENT MAJOR DEPRESSIVE DISORDER, IN FULL REMISSION (HCC): ICD-10-CM

## 2023-02-06 DIAGNOSIS — G89.29 CHRONIC LEFT SHOULDER PAIN: ICD-10-CM

## 2023-02-06 DIAGNOSIS — M25.512 CHRONIC LEFT SHOULDER PAIN: ICD-10-CM

## 2023-02-06 RX ORDER — DICLOFENAC SODIUM 75 MG/1
TABLET, DELAYED RELEASE ORAL
Qty: 60 TABLET | Refills: 1 | Status: SHIPPED | OUTPATIENT
Start: 2023-02-06

## 2023-02-06 RX ORDER — BUPROPION HYDROCHLORIDE 150 MG/1
TABLET, EXTENDED RELEASE ORAL
Qty: 180 TABLET | Refills: 0 | Status: SHIPPED | OUTPATIENT
Start: 2023-02-06

## 2023-02-20 ENCOUNTER — VIRTUAL VISIT (OUTPATIENT)
Dept: FAMILY MEDICINE CLINIC | Age: 68
End: 2023-02-20
Payer: MEDICARE

## 2023-02-20 DIAGNOSIS — R09.81 NASAL CONGESTION: ICD-10-CM

## 2023-02-20 DIAGNOSIS — H93.8X1 CONGESTION OF RIGHT EAR: Primary | ICD-10-CM

## 2023-02-20 PROCEDURE — 1090F PRES/ABSN URINE INCON ASSESS: CPT | Performed by: FAMILY MEDICINE

## 2023-02-20 PROCEDURE — 1123F ACP DISCUSS/DSCN MKR DOCD: CPT | Performed by: FAMILY MEDICINE

## 2023-02-20 PROCEDURE — G8427 DOCREV CUR MEDS BY ELIG CLIN: HCPCS | Performed by: FAMILY MEDICINE

## 2023-02-20 PROCEDURE — 1101F PT FALLS ASSESS-DOCD LE1/YR: CPT | Performed by: FAMILY MEDICINE

## 2023-02-20 PROCEDURE — 99213 OFFICE O/P EST LOW 20 MIN: CPT | Performed by: FAMILY MEDICINE

## 2023-02-20 PROCEDURE — G9899 SCRN MAM PERF RSLTS DOC: HCPCS | Performed by: FAMILY MEDICINE

## 2023-02-20 PROCEDURE — G8536 NO DOC ELDER MAL SCRN: HCPCS | Performed by: FAMILY MEDICINE

## 2023-02-20 PROCEDURE — G8400 PT W/DXA NO RESULTS DOC: HCPCS | Performed by: FAMILY MEDICINE

## 2023-02-20 PROCEDURE — G9717 DOC PT DX DEP/BP F/U NT REQ: HCPCS | Performed by: FAMILY MEDICINE

## 2023-02-20 PROCEDURE — 3017F COLORECTAL CA SCREEN DOC REV: CPT | Performed by: FAMILY MEDICINE

## 2023-02-20 PROCEDURE — G8417 CALC BMI ABV UP PARAM F/U: HCPCS | Performed by: FAMILY MEDICINE

## 2023-02-20 RX ORDER — PSEUDOEPHEDRINE HCL 30 MG
60 TABLET ORAL 3 TIMES DAILY
Qty: 120 TABLET | Refills: 0 | Status: SHIPPED | OUTPATIENT
Start: 2023-02-20

## 2023-02-20 NOTE — PROGRESS NOTES
1. Have you been to the ER, urgent care clinic since your last visit? Hospitalized since your last visit? No    2. Have you seen or consulted any other health care providers outside of the [address] [place] [address] since your last visit? Include any pap smears or colon screening. No    Chief Complaint   Patient presents with    Cough    Sneezing           Nasal Congestion     X1 month     Health Maintenance Due   Topic Date Due    COVID-19 Vaccine (1) Never done    Shingles Vaccine (1 of 2) Never done    Pneumococcal 65+ years (2 - PPSV23 if available, else PCV20) 11/21/2015    Bone Densitometry (Dexa) Screening  Never done    A1C test (Diabetic or Prediabetic)  07/21/2022    Flu Vaccine (1) 08/01/2022     3 most recent PHQ Screens 2/20/2023   Little interest or pleasure in doing things Not at all   Feeling down, depressed, irritable, or hopeless Not at all   Total Score PHQ 2 0     Abuse Screening Questionnaire 2/20/2023   Do you ever feel afraid of your partner? N   Are you in a relationship with someone who physically or mentally threatens you? N   Is it safe for you to go home? Y     Fall Risk Assessment, last 12 mths 2/20/2023   Able to walk? Yes   Fall in past 12 months? 0   Do you feel unsteady?  0   Are you worried about falling 0   Number of falls in past 12 months -   Fall with injury? -     Patient-Reported Vitals 2/20/2023   Patient-Reported Weight 190lb   Patient-Reported Height -

## 2023-02-20 NOTE — PROGRESS NOTES
Norma López is a 75 y.o. female who was seen by synchronous (real-time) audio-video technology on 2/20/2023 for Cough, Sneezing (/), and Nasal Congestion (X1 month)  She has had cough, sneezing, and congestion for a month. This has been nonstop. Her  is also sick  She has pressure in the right ear. No fever  No chest congestion but is coughing. The cough is mild and no phlegm   The nasal drainage is clear            Assessment & Plan:   Diagnoses and all orders for this visit:    1. Congestion of right ear  -     pseudoephedrine (SUDAFED) 30 mg tablet; Take 2 Tablets by mouth three (3) times daily. 2. Nasal congestion  -     pseudoephedrine (SUDAFED) 30 mg tablet; Take 2 Tablets by mouth three (3) times daily. One of this sounds bacterial so I will not send antibiotic          Subjective:       Prior to Admission medications    Medication Sig Start Date End Date Taking? Authorizing Provider   pseudoephedrine (SUDAFED) 30 mg tablet Take 2 Tablets by mouth three (3) times daily. 2/20/23  Yes Mark Banks MD   buPROPion [facility] SR) 150 mg SR tablet TAKE 1 TABLET BY MOUTH TWICE DAILY 2/6/23  Yes Maida Soares MD   diclofenac EC (VOLTAREN) 75 mg EC tablet TAKE 1 TABLET BY MOUTH TWICE DAILY AS NEEDED FOR PAIN 2/6/23  Yes Maida Soares MD   levothyroxine (SYNTHROID) 75 mcg tablet Take 1 Tablet by mouth Daily (before breakfast). Administer name brand Synthroid. 12/27/22  Yes Kobi Araujo MD   diclofenac EC (VOLTAREN) 75 mg EC tablet TAKE 1 TABLET BY MOUTH TWICE DAILY AS NEEDED FOR PAIN 12/20/22  Yes Maida Soares MD   diclofenac EC (VOLTAREN) 75 mg EC tablet Take 1 Tablet by mouth daily. Indications: joint damage causing pain and loss of function 12/20/22  Yes Clemencia Fotnana NP   famotidine (PEPCID) 40 mg tablet Take 1 Tablet by mouth daily.  10/17/22  Yes Maida Soares MD   dicyclomine (BENTYL) 10 mg capsule TAKE 1 CAPSULE BY MOUTH EVERY 6 HOURS AS NEEDED 8/22/22  Yes Cristina MD Freda   albuterol (PROVENTIL HFA, VENTOLIN HFA, PROAIR HFA) 90 mcg/actuation inhaler Take 1 Puff by inhalation every six (6) hours as needed for Wheezing. 7/25/22  Yes Damián Castillo MD   ergocalciferol (ERGOCALCIFEROL) 1,250 mcg (50,000 unit) capsule Take 1 Capsule by mouth every seven (7) days. Indications: vitamin D deficiency (high dose therapy) 6/28/22  Yes Damián Castillo MD   rosuvastatin (CRESTOR) 10 mg tablet Take 1 Tablet by mouth nightly. 6/28/22  Yes Damián Castillo MD   pantoprazole (PROTONIX) 40 mg tablet TAKE 1 TABLET BY MOUTH TWICE DAILY 10/18/21  Yes Damián Castillo MD   guselkumab (TREMFYA SC) by SubCUTAneous route. 1 injection every other month. Prescribed by Dermatology. Yes Provider, Historical   Lactobacillus acidophilus (PROBIOTIC PO) Take  by mouth. Yes Provider, Historical   cetirizine (ZYRTEC) 10 mg tablet Take  by mouth. Yes Provider, Historical   simethicone (GAS RELIEF PO) Take  by mouth. Yes Provider, Historical     Patient Active Problem List    Diagnosis Date Noted    Acquired hypothyroidism 07/13/2022    Hypercholesterolemia 06/07/2022    Vitamin D insufficiency 06/07/2022    Prediabetes 06/07/2022    Chronic bronchitis ([facility] 01/08/2020    Dysthymic 12/24/2019    Psoriasis 12/24/2019    Osteoarthritis of lumbar spine 12/24/2019     Current Outpatient Medications   Medication Sig Dispense Refill    pseudoephedrine (SUDAFED) 30 mg tablet Take 2 Tablets by mouth three (3) times daily. 120 Tablet 0    buPROPion SR (WELLBUTRIN SR) 150 mg SR tablet TAKE 1 TABLET BY MOUTH TWICE DAILY 180 Tablet 0    diclofenac EC (VOLTAREN) 75 mg EC tablet TAKE 1 TABLET BY MOUTH TWICE DAILY AS NEEDED FOR PAIN 60 Tablet 1    levothyroxine (SYNTHROID) 75 mcg tablet Take 1 Tablet by mouth Daily (before breakfast). Administer name brand Synthroid.  90 Tablet 0    diclofenac EC (VOLTAREN) 75 mg EC tablet TAKE 1 TABLET BY MOUTH TWICE DAILY AS NEEDED FOR PAIN 60 Tablet 1    diclofenac EC (VOLTAREN) 75 mg EC tablet Take 1 Tablet by mouth daily. Indications: joint damage causing pain and loss of function 90 Tablet 0    famotidine (PEPCID) 40 mg tablet Take 1 Tablet by mouth daily. 90 Tablet 5    dicyclomine (BENTYL) 10 mg capsule TAKE 1 CAPSULE BY MOUTH EVERY 6 HOURS AS NEEDED 30 Capsule 0    albuterol (PROVENTIL HFA, VENTOLIN HFA, PROAIR HFA) 90 mcg/actuation inhaler Take 1 Puff by inhalation every six (6) hours as needed for Wheezing. 18 g 5    ergocalciferol (ERGOCALCIFEROL) 1,250 mcg (50,000 unit) capsule Take 1 Capsule by mouth every seven (7) days. Indications: vitamin D deficiency (high dose therapy) 12 Capsule 5    rosuvastatin (CRESTOR) 10 mg tablet Take 1 Tablet by mouth nightly. 90 Tablet 2    pantoprazole (PROTONIX) 40 mg tablet TAKE 1 TABLET BY MOUTH TWICE DAILY 180 Tablet 1    guselkumab (TREMFYA SC) by SubCUTAneous route. 1 injection every other month. Prescribed by Dermatology. Lactobacillus acidophilus (PROBIOTIC PO) Take  by mouth. cetirizine (ZYRTEC) 10 mg tablet Take  by mouth. simethicone (GAS RELIEF PO) Take  by mouth.          ROS    Objective:     Patient-Reported Vitals 2/20/2023   Patient-Reported Weight 190lb   Patient-Reported Height -        [INSTRUCTIONS:  \"[x]\" Indicates a positive item  \"[]\" Indicates a negative item  -- DELETE ALL ITEMS NOT EXAMINED]    Constitutional: [x] Appears well-developed and well-nourished [x] No apparent distress      [] Abnormal -     Mental status: [x] Alert and awake  [x] Oriented to person/place/time [x] Able to follow commands    [] Abnormal -     Eyes:   EOM    [x]  Normal    [] Abnormal -   Sclera  [x]  Normal    [] Abnormal -          Discharge [x]  None visible   [] Abnormal -     HENT: [x] Normocephalic, atraumatic  [] Abnormal -   [x] Mouth/Throat: Mucous membranes are moist    External Ears [x] Normal  [] Abnormal -    Neck: [x] No visualized mass [] Abnormal -     Pulmonary/Chest: [x] Respiratory effort normal   [x] No visualized signs of difficulty breathing or respiratory distress        [] Abnormal -      Musculoskeletal:   [x] Normal gait with no signs of ataxia         [x] Normal range of motion of neck        [] Abnormal -     Neurological:        [x] No Facial Asymmetry (Cranial nerve 7 motor function) (limited exam due to video visit)          [x] No gaze palsy        [] Abnormal -          Skin:        [x] No significant exanthematous lesions or discoloration noted on facial skin         [] Abnormal -            Psychiatric:       [x] Normal Affect [] Abnormal -        [x] No Hallucinations    Other pertinent observable physical exam findings:-        We discussed the expected course, resolution and complications of the diagnosis(es) in detail. Medication risks, benefits, costs, interactions, and alternatives were discussed as indicated. I advised her to contact the office if her condition worsens, changes or fails to improve as anticipated. She expressed understanding with the diagnosis(es) and plan. Steffi Garrido, was evaluated through a synchronous (real-time) audio-video encounter. The patient (or guardian if applicable) is aware that this is a billable service, which includes applicable co-pays. This Virtual Visit was conducted with patient's (and/or legal guardian's) consent. The visit was conducted pursuant to the emergency declaration under the [facility][note 3][address] authority and the [employer] and [employer] General Act. Patient identification was verified, and a caregiver was present when appropriate.   The patient was located at: Home: [address]  The provider was located at: Home: Winston Perez MD

## 2023-02-23 LAB — SARS-COV-2: NOT DETECTED

## 2023-03-06 ENCOUNTER — VIRTUAL VISIT (OUTPATIENT)
Dept: FAMILY MEDICINE CLINIC | Age: 68
End: 2023-03-06
Payer: MEDICARE

## 2023-03-06 DIAGNOSIS — H92.01 RIGHT EAR PAIN: Primary | ICD-10-CM

## 2023-03-06 PROCEDURE — G9899 SCRN MAM PERF RSLTS DOC: HCPCS | Performed by: FAMILY MEDICINE

## 2023-03-06 PROCEDURE — G9717 DOC PT DX DEP/BP F/U NT REQ: HCPCS | Performed by: FAMILY MEDICINE

## 2023-03-06 PROCEDURE — 1101F PT FALLS ASSESS-DOCD LE1/YR: CPT | Performed by: FAMILY MEDICINE

## 2023-03-06 PROCEDURE — G8536 NO DOC ELDER MAL SCRN: HCPCS | Performed by: FAMILY MEDICINE

## 2023-03-06 PROCEDURE — G8417 CALC BMI ABV UP PARAM F/U: HCPCS | Performed by: FAMILY MEDICINE

## 2023-03-06 PROCEDURE — 1123F ACP DISCUSS/DSCN MKR DOCD: CPT | Performed by: FAMILY MEDICINE

## 2023-03-06 PROCEDURE — 3017F COLORECTAL CA SCREEN DOC REV: CPT | Performed by: FAMILY MEDICINE

## 2023-03-06 PROCEDURE — 99212 OFFICE O/P EST SF 10 MIN: CPT | Performed by: FAMILY MEDICINE

## 2023-03-06 PROCEDURE — 1090F PRES/ABSN URINE INCON ASSESS: CPT | Performed by: FAMILY MEDICINE

## 2023-03-06 PROCEDURE — G8400 PT W/DXA NO RESULTS DOC: HCPCS | Performed by: FAMILY MEDICINE

## 2023-03-06 PROCEDURE — G8427 DOCREV CUR MEDS BY ELIG CLIN: HCPCS | Performed by: FAMILY MEDICINE

## 2023-03-06 NOTE — PROGRESS NOTES
1. Have you been to the ER, urgent care clinic since your last visit? Hospitalized since your last visit? Yes When: 3/2023 Where: [facility] Reason for visit: Fluid Right ear    2. Have you seen or consulted any other health care providers outside of the [address] [place] [address] since your last visit? Include any pap smears or colon screening. No      Chief Complaint   Patient presents with    Referral Follow Up           Ear Fullness     3 most recent PHQ Screens 3/6/2023   Little interest or pleasure in doing things Not at all   Feeling down, depressed, irritable, or hopeless Not at all   Total Score PHQ 2 0     Abuse Screening Questionnaire 3/6/2023   Do you ever feel afraid of your partner? N   Are you in a relationship with someone who physically or mentally threatens you? N   Is it safe for you to go home? Y     3 most recent PHQ Screens 3/6/2023   Little interest or pleasure in doing things Not at all   Feeling down, depressed, irritable, or hopeless Not at all   Total Score PHQ 2 0     Patient-Reported Vitals 3/6/2023   Patient-Reported Weight 200lb   Patient-Reported Height -      Fall Risk Assessment, last 12 mths 3/6/2023   Able to walk? Yes   Fall in past 12 months? 0   Do you feel unsteady? 0   Are you worried about falling 0   Number of falls in past 12 months -   Fall with injury?  -

## 2023-03-06 NOTE — PROGRESS NOTES
Mary Wayne is a 75 y.o. female who was seen by synchronous (real-time) audio-video technology on 3/6/2023 for Referral Follow Up (/) and Ear Fullness    She recently had a URI, now c/o fluid behind right ear  This is effecting her hearing  She needs a referral to ENT      Assessment & Plan:   Diagnoses and all orders for this visit:    1. Right ear pain  -     REFERRAL TO ENT-OTOLARYNGOLOGY  She has made the appt  She says she feels miserable and is eager to get relief  Has tried decongestants and antihistamines with no relief  Referral entered for ENT      Subjective:       Prior to Admission medications    Medication Sig Start Date End Date Taking? Authorizing Provider   pseudoephedrine (SUDAFED) 30 mg tablet Take 2 Tablets by mouth three (3) times daily. 2/20/23  Yes Tyesha Rodriguez MD   buPROPion SR [facility] SR) 150 mg SR tablet TAKE 1 TABLET BY MOUTH TWICE DAILY 2/6/23  Yes Danny Smith MD   diclofenac EC (VOLTAREN) 75 mg EC tablet TAKE 1 TABLET BY MOUTH TWICE DAILY AS NEEDED FOR PAIN 2/6/23  Yes Danny Smith MD   levothyroxine (SYNTHROID) 75 mcg tablet Take 1 Tablet by mouth Daily (before breakfast). Administer name brand Synthroid. 12/27/22  Yes Hipolito Carreon MD   diclofenac EC (VOLTAREN) 75 mg EC tablet TAKE 1 TABLET BY MOUTH TWICE DAILY AS NEEDED FOR PAIN 12/20/22  Yes Danny Smith MD   diclofenac EC (VOLTAREN) 75 mg EC tablet Take 1 Tablet by mouth daily. Indications: joint damage causing pain and loss of function 12/20/22  Yes Lianne Navarro NP   famotidine (PEPCID) 40 mg tablet Take 1 Tablet by mouth daily.  10/17/22  Yes Danny Smith MD   dicyclomine (BENTYL) 10 mg capsule TAKE 1 CAPSULE BY MOUTH EVERY 6 HOURS AS NEEDED 8/22/22  Yes Danny Smith MD   albuterol (PROVENTIL HFA, VENTOLIN HFA, PROAIR HFA) 90 mcg/actuation inhaler Take 1 Puff by inhalation every six (6) hours as needed for Wheezing. 7/25/22  Yes Danny Smith MD   ergocalciferol (ERGOCALCIFEROL) 1,250 mcg (50,000 unit) capsule Take 1 Capsule by mouth every seven (7) days. Indications: vitamin D deficiency (high dose therapy) 6/28/22  Yes Carlton Shelton MD   rosuvastatin (CRESTOR) 10 mg tablet Take 1 Tablet by mouth nightly. 6/28/22  Yes Carlton Shelton MD   pantoprazole (PROTONIX) 40 mg tablet TAKE 1 TABLET BY MOUTH TWICE DAILY 10/18/21  Yes Carlton Sehlton MD   guselkumab (TREMFYA SC) by SubCUTAneous route. 1 injection every other month. Prescribed by Dermatology. Yes Provider, Historical   Lactobacillus acidophilus (PROBIOTIC PO) Take  by mouth. Yes Provider, Historical   cetirizine (ZYRTEC) 10 mg tablet Take  by mouth. Yes Provider, Historical   simethicone (GAS RELIEF PO) Take  by mouth. Yes Provider, Historical     Patient Active Problem List    Diagnosis Date Noted    Acquired hypothyroidism 07/13/2022    Hypercholesterolemia 06/07/2022    Vitamin D insufficiency 06/07/2022    Prediabetes 06/07/2022    Chronic bronchitis ([facility] [address] 01/08/2020    Dysthymic 12/24/2019    Psoriasis 12/24/2019    Osteoarthritis of lumbar spine 12/24/2019     Current Outpatient Medications   Medication Sig Dispense Refill    pseudoephedrine (SUDAFED) 30 mg tablet Take 2 Tablets by mouth three (3) times daily. 120 Tablet 0    buPROPion SR (WELLBUTRIN SR) 150 mg SR tablet TAKE 1 TABLET BY MOUTH TWICE DAILY 180 Tablet 0    diclofenac EC (VOLTAREN) 75 mg EC tablet TAKE 1 TABLET BY MOUTH TWICE DAILY AS NEEDED FOR PAIN 60 Tablet 1    levothyroxine (SYNTHROID) 75 mcg tablet Take 1 Tablet by mouth Daily (before breakfast). Administer name brand Synthroid. 90 Tablet 0    diclofenac EC (VOLTAREN) 75 mg EC tablet TAKE 1 TABLET BY MOUTH TWICE DAILY AS NEEDED FOR PAIN 60 Tablet 1    diclofenac EC (VOLTAREN) 75 mg EC tablet Take 1 Tablet by mouth daily. Indications: joint damage causing pain and loss of function 90 Tablet 0    famotidine (PEPCID) 40 mg tablet Take 1 Tablet by mouth daily.  90 Tablet 5    dicyclomine (BENTYL) 10 mg capsule TAKE 1 CAPSULE BY MOUTH EVERY 6 HOURS AS NEEDED 30 Capsule 0    albuterol (PROVENTIL HFA, VENTOLIN HFA, PROAIR HFA) 90 mcg/actuation inhaler Take 1 Puff by inhalation every six (6) hours as needed for Wheezing. 18 g 5    ergocalciferol (ERGOCALCIFEROL) 1,250 mcg (50,000 unit) capsule Take 1 Capsule by mouth every seven (7) days. Indications: vitamin D deficiency (high dose therapy) 12 Capsule 5    rosuvastatin (CRESTOR) 10 mg tablet Take 1 Tablet by mouth nightly. 90 Tablet 2    pantoprazole (PROTONIX) 40 mg tablet TAKE 1 TABLET BY MOUTH TWICE DAILY 180 Tablet 1    guselkumab (TREMFYA SC) by SubCUTAneous route. 1 injection every other month. Prescribed by Dermatology. Lactobacillus acidophilus (PROBIOTIC PO) Take  by mouth. cetirizine (ZYRTEC) 10 mg tablet Take  by mouth. simethicone (GAS RELIEF PO) Take  by mouth.          ROS    Objective:     Patient-Reported Vitals 3/6/2023   Patient-Reported Weight 200lb   Patient-Reported Height -        [INSTRUCTIONS:  \"[x]\" Indicates a positive item  \"[]\" Indicates a negative item  -- DELETE ALL ITEMS NOT EXAMINED]    Constitutional: [x] Appears well-developed and well-nourished [x] No apparent distress      [] Abnormal -     Mental status: [x] Alert and awake  [x] Oriented to person/place/time [x] Able to follow commands    [] Abnormal -     Eyes:   EOM    [x]  Normal    [] Abnormal -   Sclera  [x]  Normal    [] Abnormal -          Discharge [x]  None visible   [] Abnormal -     HENT: [x] Normocephalic, atraumatic  [] Abnormal -   [x] Mouth/Throat: Mucous membranes are moist    External Ears [x] Normal  [] Abnormal -    Neck: [x] No visualized mass [] Abnormal -     Pulmonary/Chest: [x] Respiratory effort normal   [x] No visualized signs of difficulty breathing or respiratory distress        [] Abnormal -      Musculoskeletal:   [x] Normal gait with no signs of ataxia         [x] Normal range of motion of neck        [] Abnormal -     Neurological:        [x] No Facial Asymmetry (Cranial nerve 7 motor function) (limited exam due to video visit)          [x] No gaze palsy        [] Abnormal -          Skin:        [x] No significant exanthematous lesions or discoloration noted on facial skin         [] Abnormal -            Psychiatric:       [x] Normal Affect [] Abnormal -        [x] No Hallucinations    Other pertinent observable physical exam findings:-        We discussed the expected course, resolution and complications of the diagnosis(es) in detail. Medication risks, benefits, costs, interactions, and alternatives were discussed as indicated. I advised her to contact the office if her condition worsens, changes or fails to improve as anticipated. She expressed understanding with the diagnosis(es) and plan. Carylon Mcburney, was evaluated through a synchronous (real-time) audio-video encounter. The patient (or guardian if applicable) is aware that this is a billable service, which includes applicable co-pays. This Virtual Visit was conducted with patient's (and/or legal guardian's) consent. The visit was conducted pursuant to the emergency declaration under the [address] [place] [address] authority and the [employer] and [employer] General Act. Patient identification was verified, and a caregiver was present when appropriate. The patient was located at: [facility] [address] [address]  The provider was located at: Home: Leah Condon MD

## 2023-03-29 ENCOUNTER — VIRTUAL VISIT (OUTPATIENT)
Dept: FAMILY MEDICINE CLINIC | Age: 68
End: 2023-03-29
Payer: MEDICARE

## 2023-03-29 DIAGNOSIS — J32.9 CHRONIC SINUSITIS, UNSPECIFIED LOCATION: Primary | ICD-10-CM

## 2023-03-29 DIAGNOSIS — J42 CHRONIC BRONCHITIS, UNSPECIFIED CHRONIC BRONCHITIS TYPE (HCC): ICD-10-CM

## 2023-03-29 DIAGNOSIS — E03.9 ACQUIRED HYPOTHYROIDISM: ICD-10-CM

## 2023-03-29 PROCEDURE — 1123F ACP DISCUSS/DSCN MKR DOCD: CPT | Performed by: FAMILY MEDICINE

## 2023-03-29 PROCEDURE — 1101F PT FALLS ASSESS-DOCD LE1/YR: CPT | Performed by: FAMILY MEDICINE

## 2023-03-29 PROCEDURE — 99214 OFFICE O/P EST MOD 30 MIN: CPT | Performed by: FAMILY MEDICINE

## 2023-03-29 PROCEDURE — G9899 SCRN MAM PERF RSLTS DOC: HCPCS | Performed by: FAMILY MEDICINE

## 2023-03-29 PROCEDURE — G8427 DOCREV CUR MEDS BY ELIG CLIN: HCPCS | Performed by: FAMILY MEDICINE

## 2023-03-29 PROCEDURE — G9717 DOC PT DX DEP/BP F/U NT REQ: HCPCS | Performed by: FAMILY MEDICINE

## 2023-03-29 PROCEDURE — 1090F PRES/ABSN URINE INCON ASSESS: CPT | Performed by: FAMILY MEDICINE

## 2023-03-29 PROCEDURE — G8400 PT W/DXA NO RESULTS DOC: HCPCS | Performed by: FAMILY MEDICINE

## 2023-03-29 PROCEDURE — 3017F COLORECTAL CA SCREEN DOC REV: CPT | Performed by: FAMILY MEDICINE

## 2023-03-29 RX ORDER — DOXYCYCLINE 100 MG/1
100 CAPSULE ORAL 2 TIMES DAILY
Qty: 20 CAPSULE | Refills: 0 | Status: SHIPPED | OUTPATIENT
Start: 2023-03-29 | End: 2023-04-08

## 2023-03-29 RX ORDER — CLINDAMYCIN HYDROCHLORIDE 150 MG/1
CAPSULE ORAL
COMMUNITY
Start: 2023-03-11 | End: 2023-03-29

## 2023-03-29 RX ORDER — LEVOTHYROXINE SODIUM 75 UG/1
75 TABLET ORAL
Qty: 90 TABLET | Refills: 0 | Status: SHIPPED | OUTPATIENT
Start: 2023-03-29

## 2023-03-29 RX ORDER — FLUTICASONE PROPIONATE AND SALMETEROL 500; 50 UG/1; UG/1
1 POWDER RESPIRATORY (INHALATION) EVERY 12 HOURS
Qty: 60 EACH | Refills: 1 | Status: SHIPPED | OUTPATIENT
Start: 2023-03-29

## 2023-03-29 NOTE — PROGRESS NOTES
3 days ago, Consent: Breonna Donnelly, who was seen by synchronous (real-time) audio-video technology, and/or her healthcare decision maker, is aware that this patient-initiated, Telehealth encounter on 3/29/2023 is a billable service, with coverage as determined by her insurance carrier. She is aware that she may receive a bill and has provided verbal consent to proceed: Yes. Assessment & Plan:   Diagnoses and all orders for this visit:    1. Chronic sinusitis, unspecified location  -     REFERRAL TO ENT-OTOLARYNGOLOGY  -     doxycycline (VIBRAMYCIN) 100 mg capsule; Take 1 Capsule by mouth two (2) times a day for 10 days. 2. Chronic bronchitis, unspecified chronic bronchitis type (HCC)  -     XR CHEST PA LAT; Future  -     fluticasone propion-salmeteroL (ADVAIR/WIXELA) 500-50 mcg/dose diskus inhaler; Take 1 Puff by inhalation every twelve (12) hours. 3. Acquired hypothyroidism  -     levothyroxine (SYNTHROID) 75 mcg tablet; Take 1 Tablet by mouth Daily (before breakfast). Administer name brand Synthroid. -     THYROID CASCADE PROFILE; Future          Follow-up and Dispositions    Return if symptoms worsen or fail to improve. I ADVISED PATIENT TO GO TO ER IF SYMPTOMS WORSEN , CHANGE OR FAILS TO IMPROVE. I spent at least 15 minutes with this established patient, and >50% of the time was spent counseling and/or coordinating care regarding SEE BELOW  [phone]  Subjective:   Breonna Donnelly is a 75 y.o. 1955 female  established patient, who was seen for Sinus Infection (Since January    Congestion and ear fullness     PT 1st 2x and also, ENT     Tried Clindamycin & Steroids) and Medication Refill (Synthroid)          1. Chronic sinusitis, unspecified location  Patient reports symptoms of chronic sinusitis. This has been going on since January 2023. Patient has had 2 courses of antibiotics and 2 courses of steroid pack without relief. Patient has also been evaluated by ENT.   She reports symptoms of right earache, sinus congestion, sinus pain, sputum production. I will start doxycycline. I will refer for second opinion. 2. Chronic bronchitis, unspecified chronic bronchitis type ([address]  Patient reports symptoms of chronic bronchitis. Has sputum production. Reports wheezing. Has been using albuterol inhaler. I will check x-rays. Doxycycline to cover infection. 3. Acquired hypothyroidism  I have discussed with patient that she is due for lab check. Patient is seen for followup of hypothyroidism. Patient is taking medication as prescribed. Currently taking Levothyroxine 75 mcg  Thyroid ROS: denies fatigue, tremor, weight changes, heat/cold intolerance, bowel/skin changes, palpitations  Last TSH reviewed:   Lab Results   Component Value Date/Time    TSH 5.980 (H) 12/20/2022 12:00 AM    TSH 2.98 07/13/2022 04:11 PM             Prior to Admission medications    Medication Sig Start Date End Date Taking? Authorizing Provider   levothyroxine (SYNTHROID) 75 mcg tablet Take 1 Tablet by mouth Daily (before breakfast). Administer name brand Synthroid. 3/29/23  Yes Carlton Shelton MD   fluticasone propion-salmeteroL (ADVAIR/WIXELA) 500-50 mcg/dose diskus inhaler Take 1 Puff by inhalation every twelve (12) hours. 3/29/23  Yes Carlton Shelton MD   doxycycline (VIBRAMYCIN) 100 mg capsule Take 1 Capsule by mouth two (2) times a day for 10 days. 3/29/23 4/8/23 Yes Carlton Shelton MD   rosuvastatin (CRESTOR) 10 mg tablet TAKE 1 TABLET BY MOUTH EVERY NIGHT 3/9/23  Yes Carlton Shelton MD   pseudoephedrine (SUDAFED) 30 mg tablet Take 2 Tablets by mouth three (3) times daily.  2/20/23  Yes Elin Ellsworth MD   buPROPion SR [facility] SR) 150 mg SR tablet TAKE 1 TABLET BY MOUTH TWICE DAILY 2/6/23  Yes Carlton Shelton MD   diclofenac EC (VOLTAREN) 75 mg EC tablet TAKE 1 TABLET BY MOUTH TWICE DAILY AS NEEDED FOR PAIN 2/6/23  Yes Carlton Shelton MD   diclofenac EC (VOLTAREN) 75 mg EC tablet TAKE 1 TABLET BY MOUTH TWICE DAILY AS NEEDED FOR PAIN 12/20/22  Yes Yolanda Cheney MD   diclofenac EC (VOLTAREN) 75 mg EC tablet Take 1 Tablet by mouth daily. Indications: joint damage causing pain and loss of function 12/20/22  Yes Cole Rivas NP   famotidine (PEPCID) 40 mg tablet Take 1 Tablet by mouth daily. 10/17/22  Yes Yolanda Cheney MD   dicyclomine (BENTYL) 10 mg capsule TAKE 1 CAPSULE BY MOUTH EVERY 6 HOURS AS NEEDED 8/22/22  Yes Yolanda Cheney MD   albuterol (PROVENTIL HFA, VENTOLIN HFA, PROAIR HFA) 90 mcg/actuation inhaler Take 1 Puff by inhalation every six (6) hours as needed for Wheezing. 7/25/22  Yes Yolanda Cheney MD   ergocalciferol (ERGOCALCIFEROL) 1,250 mcg (50,000 unit) capsule Take 1 Capsule by mouth every seven (7) days. Indications: vitamin D deficiency (high dose therapy) 6/28/22  Yes Yolanda Cheney MD   pantoprazole (PROTONIX) 40 mg tablet TAKE 1 TABLET BY MOUTH TWICE DAILY 10/18/21  Yes Yolanda Cheney MD   guselkumab (TREMFYA SC) by SubCUTAneous route. 1 injection every other month. Prescribed by Dermatology. Yes Provider, Historical   Lactobacillus acidophilus (PROBIOTIC PO) Take  by mouth. Yes Provider, Historical   cetirizine (ZYRTEC) 10 mg tablet Take  by mouth. Yes Provider, Historical   simethicone (GAS RELIEF PO) Take  by mouth. Yes Provider, Historical   clindamycin (CLEOCIN) 150 mg capsule TAKE 2 CAPSULES BY MOUTH THEN ONE CAPSULE FOUR TIMES DAILY UNTIL FINISHED  Patient not taking: Reported on 3/29/2023 3/11/23 3/29/23  Provider, Historical   levothyroxine (SYNTHROID) 75 mcg tablet Take 1 Tablet by mouth Daily (before breakfast). Administer name brand Synthroid.  12/27/22 3/29/23  Essence Alegre MD     No Known Allergies    Patient Active Problem List   Diagnosis Code    Dysthymic F34.1    Psoriasis L40.9    Osteoarthritis of lumbar spine M47.816    Chronic bronchitis ([facility] [address] J42    Hypercholesterolemia E78.00    Vitamin D insufficiency E55.9    Prediabetes R73.03    Acquired hypothyroidism E03.9    Chronic sinusitis J32.9     Patient Active Problem List    Diagnosis Date Noted    Chronic sinusitis 03/29/2023    Acquired hypothyroidism 07/13/2022    Hypercholesterolemia 06/07/2022    Vitamin D insufficiency 06/07/2022    Prediabetes 06/07/2022    Chronic bronchitis ([address] 01/08/2020    Dysthymic 12/24/2019    Psoriasis 12/24/2019    Osteoarthritis of lumbar spine 12/24/2019     Current Outpatient Medications   Medication Sig Dispense Refill    levothyroxine (SYNTHROID) 75 mcg tablet Take 1 Tablet by mouth Daily (before breakfast). Administer name brand Synthroid. 90 Tablet 0    fluticasone propion-salmeteroL (ADVAIR/WIXELA) 500-50 mcg/dose diskus inhaler Take 1 Puff by inhalation every twelve (12) hours. 60 Each 1    doxycycline (VIBRAMYCIN) 100 mg capsule Take 1 Capsule by mouth two (2) times a day for 10 days. 20 Capsule 0    rosuvastatin (CRESTOR) 10 mg tablet TAKE 1 TABLET BY MOUTH EVERY NIGHT 90 Tablet 0    pseudoephedrine (SUDAFED) 30 mg tablet Take 2 Tablets by mouth three (3) times daily. 120 Tablet 0    buPROPion SR (WELLBUTRIN SR) 150 mg SR tablet TAKE 1 TABLET BY MOUTH TWICE DAILY 180 Tablet 0    diclofenac EC (VOLTAREN) 75 mg EC tablet TAKE 1 TABLET BY MOUTH TWICE DAILY AS NEEDED FOR PAIN 60 Tablet 1    diclofenac EC (VOLTAREN) 75 mg EC tablet TAKE 1 TABLET BY MOUTH TWICE DAILY AS NEEDED FOR PAIN 60 Tablet 1    diclofenac EC (VOLTAREN) 75 mg EC tablet Take 1 Tablet by mouth daily. Indications: joint damage causing pain and loss of function 90 Tablet 0    famotidine (PEPCID) 40 mg tablet Take 1 Tablet by mouth daily. 90 Tablet 5    dicyclomine (BENTYL) 10 mg capsule TAKE 1 CAPSULE BY MOUTH EVERY 6 HOURS AS NEEDED 30 Capsule 0    albuterol (PROVENTIL HFA, VENTOLIN HFA, PROAIR HFA) 90 mcg/actuation inhaler Take 1 Puff by inhalation every six (6) hours as needed for Wheezing.  18 g 5    ergocalciferol (ERGOCALCIFEROL) 1,250 mcg (50,000 unit) capsule Take 1 Capsule by mouth every seven (7) days. Indications: vitamin D deficiency (high dose therapy) 12 Capsule 5    pantoprazole (PROTONIX) 40 mg tablet TAKE 1 TABLET BY MOUTH TWICE DAILY 180 Tablet 1    guselkumab (TREMFYA SC) by SubCUTAneous route. 1 injection every other month. Prescribed by Dermatology. Lactobacillus acidophilus (PROBIOTIC PO) Take  by mouth. cetirizine (ZYRTEC) 10 mg tablet Take  by mouth. simethicone (GAS RELIEF PO) Take  by mouth. No Known Allergies  Past Medical History:   Diagnosis Date    DJD (degenerative joint disease)     Osteoarthritis of lumbar spine 12/24/2019    Psoriasis     Recurrent major depressive disorder, in full remission ([facility] [address] 12/24/2019     Past Surgical History:   Procedure Laterality Date    HX MYOMECTOMY      HX WRIST FRACTURE TX       Family History   Problem Relation Age of Onset    Hypertension Mother     Heart Attack Father     Other Sister         anuersym    Hypertension Sister     Other Brother         brain tumor    Breast Cancer Maternal Aunt      Social History     Tobacco Use    Smoking status: Former    Smokeless tobacco: Former   Substance Use Topics    Alcohol use: Yes     Alcohol/week: 2.0 standard drinks     Types: 2 Cans of beer per week     Comment: ocassionally       Review of Systems   Constitutional: Negative. HENT:  Positive for congestion, ear pain and sinus pain. Negative for ear discharge. Eyes: Negative. Respiratory:  Positive for sputum production and wheezing. Cardiovascular: Negative. Gastrointestinal: Negative. Genitourinary: Negative. Musculoskeletal: Negative. Skin: Negative. Neurological: Negative. Endo/Heme/Allergies: Negative. Psychiatric/Behavioral: Negative.            Objective:     Patient-Reported Vitals 3/6/2023   Patient-Reported Weight 200lb   Patient-Reported Height -        [INSTRUCTIONS:  \"[x]\" Indicates a positive item  \"[]\" Indicates a negative item  -- DELETE ALL ITEMS NOT EXAMINED]    Constitutional: [x] Appears well-developed and well-nourished [x] No apparent distress      [] Abnormal -     Mental status: [x] Alert and awake  [x] Oriented to person/place/time [x] Able to follow commands    [] Abnormal -     Eyes:   EOM    [x]  Normal    [] Abnormal -   Sclera  [x]  Normal    [] Abnormal -          Discharge [x]  None visible   [] Abnormal -     HENT: [x] Normocephalic, atraumatic  [] Abnormal -   [x] Mouth/Throat: Mucous membranes are moist    External Ears [x] Normal  [] Abnormal -    Neck: [x] No visualized mass [] Abnormal -     Pulmonary/Chest: [x] Respiratory effort normal   [x] No visualized signs of difficulty breathing or respiratory distress        [] Abnormal -      Musculoskeletal:   [x] Normal gait with no signs of ataxia         [x] Normal range of motion of neck        [] Abnormal -     Neurological:        [x] No Facial Asymmetry (Cranial nerve 7 motor function) (limited exam due to video visit)          [x] No gaze palsy        [] Abnormal -          Skin:        [x] No significant exanthematous lesions or discoloration noted on facial skin         [] Abnormal -            Psychiatric:       [x] Normal Affect [] Abnormal -        [x] No Hallucinations    Other pertinent observable physical exam findings:-    We discussed the expected course, resolution and complications of the diagnosis(es) in detail. Medication risks, benefits, costs, interactions, and alternatives were discussed as indicated. I advised her to contact the office if her condition worsens, changes or fails to improve as anticipated. She expressed understanding with the diagnosis(es) and plan. Derrell Ball is a 75 y.o. female  is being evaluated by a Virtual Visit (video visit) encounter to address concerns as mentioned above. A caregiver was present when appropriate.  Due to this being a TeleHealth encounter (During [facility] public health emergency), evaluation of the following organ systems was limited: Vitals/Constitutional/EENT/Resp/CV/GI//MS/Neuro/Skin/Heme-Lymph-Imm. Pursuant to the emergency declaration under the [address] [place] [address] and the [employer] Act, this Virtual Visit was conducted with patient's (and/or legal guardian's) consent, to reduce the patient's risk of exposure to COVID-19 and provide necessary medical care. The patient (and/or legal guardian) has also been advised to contact this office for worsening conditions or problems, and seek emergency medical treatment and/or call 911 if deemed necessary. Patient identification was verified at the start of the visit: YES    Services were provided through a video synchronous discussion virtually to substitute for in-person clinic visit. Patient was located at their homes. Provider located in office    An electronic signature was used to authenticate this note.       Cee Liz MD

## 2023-03-29 NOTE — PROGRESS NOTES
Patient stated name &   Chief Complaint   Patient presents with    Sinus Infection     Since January    Congestion and ear fullness     PT 1st 2x and also, ENT     Tried Clindamycin & Steroids    Medication Refill     Synthroid        Health Maintenance Due   Topic    COVID-19 Vaccine (1)    Shingles Vaccine (1 of 2)    Pneumococcal 65+ years (2 - PPSV23 if available, else PCV20)    Bone Densitometry (Dexa) Screening     A1C test (Diabetic or Prediabetic)     Flu Vaccine (1)       Wt Readings from Last 3 Encounters:   22 203 lb (92.1 kg)   22 208 lb 9.6 oz (94.6 kg)   22 207 lb 12.8 oz (94.3 kg)     Temp Readings from Last 3 Encounters:   22 97.3 °F (36.3 °C) (Skin)   22 98.2 °F (36.8 °C) (Temporal)   22 98.2 °F (36.8 °C) (Temporal)     BP Readings from Last 3 Encounters:   22 113/76   22 116/80   22 115/75     Pulse Readings from Last 3 Encounters:   22 81   22 83   22 86         Learning Assessment:  :     No flowsheet data found. Depression Screening:  :     3 most recent PHQ Screens 3/6/2023   Little interest or pleasure in doing things Not at all   Feeling down, depressed, irritable, or hopeless Not at all   Total Score PHQ 2 0       Fall Risk Assessment:  :     Fall Risk Assessment, last 12 mths 3/6/2023   Able to walk? Yes   Fall in past 12 months? 0   Do you feel unsteady? 0   Are you worried about falling 0   Number of falls in past 12 months -   Fall with injury? -       Abuse Screening:  :     Abuse Screening Questionnaire 3/6/2023 2023 2022 2022 2022 2022 2021   Do you ever feel afraid of your partner? N N N N N N N   Are you in a relationship with someone who physically or mentally threatens you? N N N N N N N   Is it safe for you to go home? Y Y Y Y Y Y Y       Coordination of Care Questionnaire:  :   1. \"Have you been to the ER, urgent care clinic since your last visit?   Hospitalized since your last visit? \" No    2. \"Have you seen or consulted any other health care providers outside of the [address] [place] [address] since your last visit? \" No     3. For patients aged 39-70: Has the patient had a colonoscopy / FIT/ Cologuard? No      If the patient is female:    4. For patients aged 41-77: Has the patient had a mammogram within the past 2 years? No      5. For patients aged 21-65: Has the patient had a pap smear? No     3) Do you have an Advance Directive on file? no  Are you interested in receiving information about Advance Directives? no    Patient is accompanied by self I have received verbal consent from Zulay Beltran to discuss any/all medical information while they are present in the room.

## 2023-03-30 ENCOUNTER — TELEPHONE (OUTPATIENT)
Dept: FAMILY MEDICINE CLINIC | Age: 68
End: 2023-03-30

## 2023-04-05 RX ORDER — BUPROPION HYDROCHLORIDE 150 MG/1
TABLET, EXTENDED RELEASE ORAL
Qty: 180 TABLET | Refills: 0
Start: 2023-04-05

## 2023-04-24 DIAGNOSIS — G89.29 CHRONIC LEFT SHOULDER PAIN: ICD-10-CM

## 2023-04-24 DIAGNOSIS — M25.512 CHRONIC LEFT SHOULDER PAIN: ICD-10-CM

## 2023-04-27 DIAGNOSIS — M25.512 CHRONIC LEFT SHOULDER PAIN: ICD-10-CM

## 2023-04-27 DIAGNOSIS — G89.29 CHRONIC LEFT SHOULDER PAIN: ICD-10-CM

## 2023-05-01 RX ORDER — DICLOFENAC SODIUM 75 MG/1
TABLET, DELAYED RELEASE ORAL
Qty: 60 TABLET | Refills: 1 | OUTPATIENT
Start: 2023-05-01

## 2023-05-01 NOTE — TELEPHONE ENCOUNTER
Med already refilled last week by Dr. Judah Hamilton.   If she still is having problems, she needs to be seen  [facility]

## 2023-05-02 RX ORDER — DICLOFENAC SODIUM 75 MG/1
TABLET, DELAYED RELEASE ORAL
Qty: 60 TABLET | Refills: 1 | Status: SHIPPED | OUTPATIENT
Start: 2023-05-02

## 2023-06-14 RX ORDER — ROSUVASTATIN CALCIUM 10 MG/1
TABLET, COATED ORAL
Qty: 90 TABLET | Refills: 0 | OUTPATIENT
Start: 2023-06-14

## 2023-06-25 SDOH — ECONOMIC STABILITY: TRANSPORTATION INSECURITY
IN THE PAST 12 MONTHS, HAS LACK OF TRANSPORTATION KEPT YOU FROM MEETINGS, WORK, OR FROM GETTING THINGS NEEDED FOR DAILY LIVING?: NO

## 2023-06-25 SDOH — ECONOMIC STABILITY: HOUSING INSECURITY
IN THE LAST 12 MONTHS, WAS THERE A TIME WHEN YOU DID NOT HAVE A STEADY PLACE TO SLEEP OR SLEPT IN A SHELTER (INCLUDING NOW)?: NO

## 2023-06-25 SDOH — ECONOMIC STABILITY: FOOD INSECURITY: WITHIN THE PAST 12 MONTHS, YOU WORRIED THAT YOUR FOOD WOULD RUN OUT BEFORE YOU GOT MONEY TO BUY MORE.: NEVER TRUE

## 2023-06-25 SDOH — ECONOMIC STABILITY: INCOME INSECURITY: HOW HARD IS IT FOR YOU TO PAY FOR THE VERY BASICS LIKE FOOD, HOUSING, MEDICAL CARE, AND HEATING?: NOT VERY HARD

## 2023-06-25 SDOH — ECONOMIC STABILITY: FOOD INSECURITY: WITHIN THE PAST 12 MONTHS, THE FOOD YOU BOUGHT JUST DIDN'T LAST AND YOU DIDN'T HAVE MONEY TO GET MORE.: NEVER TRUE

## 2023-06-26 ENCOUNTER — OFFICE VISIT (OUTPATIENT)
Facility: CLINIC | Age: 68
End: 2023-06-26
Payer: MEDICARE

## 2023-06-26 VITALS
HEART RATE: 88 BPM | OXYGEN SATURATION: 95 % | TEMPERATURE: 97.1 F | HEIGHT: 61 IN | DIASTOLIC BLOOD PRESSURE: 76 MMHG | BODY MASS INDEX: 38.71 KG/M2 | SYSTOLIC BLOOD PRESSURE: 111 MMHG | WEIGHT: 205 LBS | RESPIRATION RATE: 18 BRPM

## 2023-06-26 DIAGNOSIS — B35.1 TOENAIL FUNGUS: ICD-10-CM

## 2023-06-26 DIAGNOSIS — E78.00 PURE HYPERCHOLESTEROLEMIA, UNSPECIFIED: ICD-10-CM

## 2023-06-26 DIAGNOSIS — L65.9 HAIR LOSS: ICD-10-CM

## 2023-06-26 DIAGNOSIS — Z13.9 SCREENING DUE: ICD-10-CM

## 2023-06-26 DIAGNOSIS — R73.01 IMPAIRED FASTING GLUCOSE: ICD-10-CM

## 2023-06-26 DIAGNOSIS — F33.42 MAJOR DEPRESSIVE DISORDER, RECURRENT, IN FULL REMISSION (HCC): ICD-10-CM

## 2023-06-26 DIAGNOSIS — M79.604 PAIN IN BOTH LOWER EXTREMITIES: ICD-10-CM

## 2023-06-26 DIAGNOSIS — K21.00 GASTRO-ESOPHAGEAL REFLUX DISEASE WITH ESOPHAGITIS, WITHOUT BLEEDING: ICD-10-CM

## 2023-06-26 DIAGNOSIS — E03.9 HYPOTHYROIDISM, UNSPECIFIED TYPE: Primary | ICD-10-CM

## 2023-06-26 DIAGNOSIS — E55.9 VITAMIN D DEFICIENCY, UNSPECIFIED: ICD-10-CM

## 2023-06-26 DIAGNOSIS — M79.605 PAIN IN BOTH LOWER EXTREMITIES: ICD-10-CM

## 2023-06-26 PROCEDURE — 99214 OFFICE O/P EST MOD 30 MIN: CPT | Performed by: NURSE PRACTITIONER

## 2023-06-26 PROCEDURE — 1123F ACP DISCUSS/DSCN MKR DOCD: CPT | Performed by: NURSE PRACTITIONER

## 2023-06-26 RX ORDER — DICYCLOMINE HYDROCHLORIDE 10 MG/1
10 CAPSULE ORAL DAILY
Qty: 120 CAPSULE | Refills: 1 | Status: SHIPPED | OUTPATIENT
Start: 2023-06-26

## 2023-06-26 RX ORDER — DICLOFENAC SODIUM 75 MG/1
TABLET, DELAYED RELEASE ORAL
Qty: 60 TABLET | OUTPATIENT
Start: 2023-06-26

## 2023-06-26 RX ORDER — ERGOCALCIFEROL 1.25 MG/1
50000 CAPSULE ORAL
Qty: 4 CAPSULE | Refills: 2 | Status: SHIPPED | OUTPATIENT
Start: 2023-06-26

## 2023-06-26 RX ORDER — LEVOTHYROXINE SODIUM 0.07 MG/1
75 TABLET ORAL
Qty: 90 TABLET | Refills: 1 | Status: SHIPPED | OUTPATIENT
Start: 2023-06-26

## 2023-06-26 RX ORDER — DICLOFENAC SODIUM 75 MG/1
75 TABLET, DELAYED RELEASE ORAL 2 TIMES DAILY PRN
Qty: 60 TABLET | Refills: 1 | Status: SHIPPED | OUTPATIENT
Start: 2023-06-26

## 2023-06-26 RX ORDER — ALBUTEROL SULFATE 90 UG/1
1 AEROSOL, METERED RESPIRATORY (INHALATION) EVERY 6 HOURS PRN
Qty: 18 G | Refills: 1 | Status: SHIPPED | OUTPATIENT
Start: 2023-06-26

## 2023-06-26 RX ORDER — LEVOTHYROXINE SODIUM 75 MCG
TABLET ORAL
Qty: 90 TABLET | OUTPATIENT
Start: 2023-06-26

## 2023-06-26 RX ORDER — ROSUVASTATIN CALCIUM 10 MG/1
10 TABLET, COATED ORAL NIGHTLY
Qty: 90 TABLET | Refills: 1 | Status: SHIPPED | OUTPATIENT
Start: 2023-06-26

## 2023-06-26 RX ORDER — FAMOTIDINE 40 MG/1
40 TABLET, FILM COATED ORAL DAILY
Qty: 90 TABLET | Refills: 1 | Status: SHIPPED | OUTPATIENT
Start: 2023-06-26

## 2023-06-26 RX ORDER — BUPROPION HYDROCHLORIDE 150 MG/1
150 TABLET, EXTENDED RELEASE ORAL 2 TIMES DAILY
Qty: 180 TABLET | Refills: 1 | Status: SHIPPED | OUTPATIENT
Start: 2023-06-26

## 2023-06-26 ASSESSMENT — ENCOUNTER SYMPTOMS
BACK PAIN: 1
EYES NEGATIVE: 1
ALLERGIC/IMMUNOLOGIC NEGATIVE: 1
RESPIRATORY NEGATIVE: 1
GASTROINTESTINAL NEGATIVE: 1

## 2023-06-26 NOTE — TELEPHONE ENCOUNTER
PCP: AZRA Williamson NP    Last appt:  03/29/23        Future Appointments   Date Time Provider [address]   6/26/2023  9:20 AM AZRA Williamson NP [facility]       Requested Prescriptions     Pending Prescriptions Disp Refills    SYNTHROID 75 MCG tablet [Pharmacy Med Name: SYNTHROID 0.075MG (75MCG)TABLETS] 90 tablet      Sig: TAKE 1 TABLET BY MOUTH DAILY BEFORE BREAKFAST    diclofenac (VOLTAREN) 75 MG EC tablet [Pharmacy Med Name: DICLOFENAC SODIUM 75MG DR TABLETS] 60 tablet      Sig: TAKE 1 TABLET BY MOUTH TWICE DAILY AS NEEDED FOR PAIN       Prior labs and Blood pressures:  BP Readings from Last 3 Encounters:   12/20/22 113/76   07/25/22 116/80   07/13/22 115/75     Lab Results   Component Value Date/Time     07/13/2022 04:11 PM    K 4.5 07/13/2022 04:11 PM     07/13/2022 04:11 PM    CO2 27 07/13/2022 04:11 PM    BUN 23 07/13/2022 04:11 PM    GFRAA >60 07/13/2022 04:11 PM     No results found for: HBA1C, [id number]  Lab Results   Component Value Date/Time    CHOL 233 05/27/2022 08:57 AM    HDL 56 05/27/2022 08:57 AM    VLDL 27 05/27/2022 08:57 AM     No results found for: VITD3, VD3RIA    Lab Results   Component Value Date/Time    TSH 0.841 03/30/2023 03:14 PM

## 2023-06-26 NOTE — TELEPHONE ENCOUNTER
PCP: AZRA Blanca NP    Last appt:  03/29/23        Future Appointments   Date Time Provider [address]   6/26/2023  9:20 AM AZRA Blanca NP [facility]       Requested Prescriptions     Pending Prescriptions Disp Refills    SYNTHROID 75 MCG tablet [Pharmacy Med Name: SYNTHROID 0.075MG (75MCG)TABLETS] 90 tablet      Sig: TAKE 1 TABLET BY MOUTH DAILY BEFORE BREAKFAST    diclofenac (VOLTAREN) 75 MG EC tablet [Pharmacy Med Name: DICLOFENAC SODIUM 75MG DR TABLETS] 60 tablet      Sig: TAKE 1 TABLET BY MOUTH TWICE DAILY AS NEEDED FOR PAIN       Prior labs and Blood pressures:  BP Readings from Last 3 Encounters:   12/20/22 113/76   07/25/22 116/80   07/13/22 115/75     Lab Results   Component Value Date/Time     07/13/2022 04:11 PM    K 4.5 07/13/2022 04:11 PM     07/13/2022 04:11 PM    CO2 27 07/13/2022 04:11 PM    BUN 23 07/13/2022 04:11 PM    GFRAA >60 07/13/2022 04:11 PM     No results found for: HBA1C, [id number]  Lab Results   Component Value Date/Time    CHOL 233 05/27/2022 08:57 AM    HDL 56 05/27/2022 08:57 AM    VLDL 27 05/27/2022 08:57 AM     No results found for: VITD3, VD3RIA    Lab Results   Component Value Date/Time    TSH 0.841 03/30/2023 03:14 PM

## 2023-06-27 LAB
25(OH)D3+25(OH)D2 SERPL-MCNC: 50.1 NG/ML (ref 30–100)
ALBUMIN SERPL-MCNC: 4.3 G/DL (ref 3.8–4.8)
ALBUMIN/GLOB SERPL: 1.8 {RATIO} (ref 1.2–2.2)
ALP SERPL-CCNC: 57 IU/L (ref 44–121)
ALT SERPL-CCNC: 26 IU/L (ref 0–32)
AST SERPL-CCNC: 19 IU/L (ref 0–40)
BASOPHILS # BLD AUTO: 0.1 X10E3/UL (ref 0–0.2)
BASOPHILS NFR BLD AUTO: 1 %
BILIRUB SERPL-MCNC: 0.3 MG/DL (ref 0–1.2)
BUN SERPL-MCNC: 22 MG/DL (ref 8–27)
BUN/CREAT SERPL: 21 (ref 12–28)
CALCIUM SERPL-MCNC: 9.5 MG/DL (ref 8.7–10.3)
CHLORIDE SERPL-SCNC: 107 MMOL/L (ref 96–106)
CO2 SERPL-SCNC: 22 MMOL/L (ref 20–29)
CREAT SERPL-MCNC: 1.07 MG/DL (ref 0.57–1)
EGFRCR SERPLBLD CKD-EPI 2021: 56 ML/MIN/1.73
EOSINOPHIL # BLD AUTO: 0.4 X10E3/UL (ref 0–0.4)
EOSINOPHIL NFR BLD AUTO: 7 %
ERYTHROCYTE [DISTWIDTH] IN BLOOD BY AUTOMATED COUNT: 12.4 % (ref 11.7–15.4)
GLOBULIN SER CALC-MCNC: 2.4 G/DL (ref 1.5–4.5)
GLUCOSE SERPL-MCNC: 100 MG/DL (ref 70–99)
HBA1C MFR BLD: 5.8 % (ref 4.8–5.6)
HCT VFR BLD AUTO: 41.6 % (ref 34–46.6)
HGB BLD-MCNC: 14.1 G/DL (ref 11.1–15.9)
IMM GRANULOCYTES # BLD AUTO: 0 X10E3/UL (ref 0–0.1)
IMM GRANULOCYTES NFR BLD AUTO: 0 %
LYMPHOCYTES # BLD AUTO: 1.9 X10E3/UL (ref 0.7–3.1)
LYMPHOCYTES NFR BLD AUTO: 28 %
MCH RBC QN AUTO: 31.2 PG (ref 26.6–33)
MCHC RBC AUTO-ENTMCNC: 33.9 G/DL (ref 31.5–35.7)
MCV RBC AUTO: 92 FL (ref 79–97)
MONOCYTES # BLD AUTO: 0.5 X10E3/UL (ref 0.1–0.9)
MONOCYTES NFR BLD AUTO: 7 %
NEUTROPHILS # BLD AUTO: 3.7 X10E3/UL (ref 1.4–7)
NEUTROPHILS NFR BLD AUTO: 57 %
PLATELET # BLD AUTO: 259 X10E3/UL (ref 150–450)
POTASSIUM SERPL-SCNC: 4.6 MMOL/L (ref 3.5–5.2)
PROT SERPL-MCNC: 6.7 G/DL (ref 6–8.5)
RBC # BLD AUTO: 4.52 X10E6/UL (ref 3.77–5.28)
SODIUM SERPL-SCNC: 144 MMOL/L (ref 134–144)
TSH SERPL DL<=0.005 MIU/L-ACNC: 1.08 UIU/ML (ref 0.45–4.5)
WBC # BLD AUTO: 6.6 X10E3/UL (ref 3.4–10.8)

## 2023-07-12 DIAGNOSIS — F33.42 MAJOR DEPRESSIVE DISORDER, RECURRENT, IN FULL REMISSION (HCC): ICD-10-CM

## 2023-07-17 RX ORDER — BUPROPION HYDROCHLORIDE 150 MG/1
TABLET, EXTENDED RELEASE ORAL
Qty: 180 TABLET | Refills: 1 | Status: SHIPPED | OUTPATIENT
Start: 2023-07-17

## 2023-08-19 DIAGNOSIS — E55.9 VITAMIN D DEFICIENCY, UNSPECIFIED: ICD-10-CM

## 2023-08-22 RX ORDER — ERGOCALCIFEROL 1.25 MG/1
CAPSULE ORAL
Qty: 12 CAPSULE | Refills: 0 | Status: SHIPPED | OUTPATIENT
Start: 2023-08-22

## 2023-11-23 DIAGNOSIS — E55.9 VITAMIN D DEFICIENCY, UNSPECIFIED: ICD-10-CM

## 2023-11-24 NOTE — TELEPHONE ENCOUNTER
PCP: Augustin Koch, APRN - NP    Last appt: [unfilled]  Patient was last seen on 06/26/2023. She does not have any further appts.   No future appointments.    Requested Prescriptions     Pending Prescriptions Disp Refills    vitamin D (ERGOCALCIFEROL) 1.25 MG (09567 UT) CAPS capsule [Pharmacy Med Name: VITAMIN D2 50,000IU (ERGO) CAP RX] 4 capsule      Sig: TAKE 1 CAPSULE BY MOUTH EVERY 7 DAYS       Prior labs and Blood pressures:  BP Readings from Last 3 Encounters:   06/26/23 111/76   12/20/22 113/76   07/25/22 116/80     Lab Results   Component Value Date/Time     06/26/2023 12:00 AM    K 4.6 06/26/2023 12:00 AM     06/26/2023 12:00 AM    CO2 22 06/26/2023 12:00 AM    BUN 22 06/26/2023 12:00 AM    GFRAA >60 07/13/2022 04:11 PM     No results found for: \"HBA1C\", \"[id number]\"  Lab Results   Component Value Date/Time    CHOL 233 05/27/2022 08:57 AM    HDL 56 05/27/2022 08:57 AM    VLDL 27 05/27/2022 08:57 AM     No results found for: \"VITD3\", \"VD3RIA\"    Lab Results   Component Value Date/Time    TSH 1.080 06/26/2023 12:00 AM

## 2023-11-27 RX ORDER — ERGOCALCIFEROL 1.25 MG/1
CAPSULE ORAL
Qty: 4 CAPSULE | OUTPATIENT
Start: 2023-11-27

## 2024-01-20 DIAGNOSIS — E78.00 PURE HYPERCHOLESTEROLEMIA, UNSPECIFIED: ICD-10-CM

## 2024-01-22 RX ORDER — ROSUVASTATIN CALCIUM 10 MG/1
10 TABLET, COATED ORAL NIGHTLY
Qty: 90 TABLET | Refills: 1 | Status: SHIPPED | OUTPATIENT
Start: 2024-01-22

## 2024-01-22 NOTE — TELEPHONE ENCOUNTER
PCP: Augustin Kcoh, APRN - NP    Last appt: [unfilled]  No future appointments.    Requested Prescriptions     Pending Prescriptions Disp Refills    rosuvastatin (CRESTOR) 10 MG tablet [Pharmacy Med Name: ROSUVASTATIN 10MG TABLETS] 90 tablet 1     Sig: TAKE 1 TABLET BY MOUTH EVERY NIGHT       Prior labs and Blood pressures:  BP Readings from Last 3 Encounters:   06/26/23 111/76   12/20/22 113/76   07/25/22 116/80     Lab Results   Component Value Date/Time     06/26/2023 12:00 AM    K 4.6 06/26/2023 12:00 AM     06/26/2023 12:00 AM    CO2 22 06/26/2023 12:00 AM    BUN 22 06/26/2023 12:00 AM    GFRAA >60 07/13/2022 04:11 PM     No results found for: \"HBA1C\", \"[id number]\"  Lab Results   Component Value Date/Time    CHOL 233 05/27/2022 08:57 AM    HDL 56 05/27/2022 08:57 AM    VLDL 27 05/27/2022 08:57 AM     No results found for: \"VITD3\", \"VD3RIA\"    Lab Results   Component Value Date/Time    TSH 1.080 06/26/2023 12:00 AM

## 2024-01-30 ENCOUNTER — TRANSCRIBE ORDERS (OUTPATIENT)
Facility: HOSPITAL | Age: 69
End: 2024-01-30

## 2024-01-30 DIAGNOSIS — Z12.31 VISIT FOR SCREENING MAMMOGRAM: Primary | ICD-10-CM

## 2024-02-13 ENCOUNTER — HOSPITAL ENCOUNTER (OUTPATIENT)
Facility: HOSPITAL | Age: 69
Discharge: HOME | End: 2024-02-16
Payer: MEDICARE

## 2024-02-13 ENCOUNTER — HOSPITAL ENCOUNTER (OUTPATIENT)
Facility: HOSPITAL | Age: 69
Discharge: HOME | End: 2024-02-16
Attending: FAMILY MEDICINE
Payer: MEDICARE

## 2024-02-13 VITALS — BODY MASS INDEX: 38.71 KG/M2 | WEIGHT: 205 LBS | HEIGHT: 61 IN

## 2024-02-13 DIAGNOSIS — Z98.1 S/P LUMBAR SPINAL FUSION: ICD-10-CM

## 2024-02-13 DIAGNOSIS — M54.16 LUMBAR RADICULITIS: ICD-10-CM

## 2024-02-13 DIAGNOSIS — Z12.31 VISIT FOR SCREENING MAMMOGRAM: ICD-10-CM

## 2024-02-13 PROCEDURE — 72131 CT LUMBAR SPINE W/O DYE: CPT

## 2024-02-13 PROCEDURE — 72148 MRI LUMBAR SPINE W/O DYE: CPT

## 2024-02-13 PROCEDURE — 77063 BREAST TOMOSYNTHESIS BI: CPT

## 2024-03-30 DIAGNOSIS — K21.00 GASTRO-ESOPHAGEAL REFLUX DISEASE WITH ESOPHAGITIS, WITHOUT BLEEDING: ICD-10-CM

## 2024-04-01 RX ORDER — FAMOTIDINE 40 MG/1
40 TABLET, FILM COATED ORAL DAILY
Qty: 90 TABLET | Refills: 0 | Status: SHIPPED | OUTPATIENT
Start: 2024-04-01

## 2024-04-01 NOTE — TELEPHONE ENCOUNTER
PCP: Augustin Koch, APRN - NP    Last appt: [unfilled]  No future appointments.    Requested Prescriptions     Pending Prescriptions Disp Refills    famotidine (PEPCID) 40 MG tablet [Pharmacy Med Name: FAMOTIDINE 40MG TABLETS] 90 tablet 1     Sig: TAKE 1 TABLET BY MOUTH DAILY       Prior labs and Blood pressures:  BP Readings from Last 3 Encounters:   06/26/23 111/76   12/20/22 113/76   07/25/22 116/80     Lab Results   Component Value Date/Time     06/26/2023 12:00 AM    K 4.6 06/26/2023 12:00 AM     06/26/2023 12:00 AM    CO2 22 06/26/2023 12:00 AM    BUN 22 06/26/2023 12:00 AM    GFRAA >60 07/13/2022 04:11 PM     No results found for: \"HBA1C\", \"[id number]\"  Lab Results   Component Value Date/Time    CHOL 233 05/27/2022 08:57 AM    HDL 56 05/27/2022 08:57 AM    VLDL 27 05/27/2022 08:57 AM     No results found for: \"VITD3\", \"VD3RIA\"    Lab Results   Component Value Date/Time    TSH 1.080 06/26/2023 12:00 AM

## 2024-05-20 DIAGNOSIS — F33.42 MAJOR DEPRESSIVE DISORDER, RECURRENT, IN FULL REMISSION (HCC): ICD-10-CM

## 2024-05-20 RX ORDER — BUPROPION HYDROCHLORIDE 150 MG/1
TABLET, EXTENDED RELEASE ORAL
Qty: 180 TABLET | Refills: 1 | Status: SHIPPED | OUTPATIENT
Start: 2024-05-20

## 2024-05-20 NOTE — TELEPHONE ENCOUNTER
PCP: Augustin Koch, APRN - NP    Last appt: [unfilled]  No future appointments.    Requested Prescriptions     Pending Prescriptions Disp Refills    buPROPion (WELLBUTRIN SR) 150 MG extended release tablet [Pharmacy Med Name: BUPROPION SR 150MG TABLETS (12 H)] 180 tablet 1     Sig: TAKE 1 TABLET BY MOUTH TWICE DAILY       Prior labs and Blood pressures:  BP Readings from Last 3 Encounters:   06/26/23 111/76   12/20/22 113/76   07/25/22 116/80     Lab Results   Component Value Date/Time     06/26/2023 12:00 AM    K 4.6 06/26/2023 12:00 AM     06/26/2023 12:00 AM    CO2 22 06/26/2023 12:00 AM    BUN 22 06/26/2023 12:00 AM    GFRAA >60 07/13/2022 04:11 PM     No results found for: \"HBA1C\", \"[id number]\"  Lab Results   Component Value Date/Time    CHOL 233 05/27/2022 08:57 AM    HDL 56 05/27/2022 08:57 AM     05/27/2022 08:57 AM    VLDL 27 05/27/2022 08:57 AM     No results found for: \"VITD3\"    Lab Results   Component Value Date/Time    TSH 1.080 06/26/2023 12:00 AM

## 2024-06-20 DIAGNOSIS — K21.00 GASTRO-ESOPHAGEAL REFLUX DISEASE WITH ESOPHAGITIS, WITHOUT BLEEDING: ICD-10-CM

## 2024-06-21 RX ORDER — DICYCLOMINE HYDROCHLORIDE 10 MG/1
10 CAPSULE ORAL DAILY
Qty: 30 CAPSULE | Refills: 0 | Status: SHIPPED | OUTPATIENT
Start: 2024-06-21

## 2024-06-28 DIAGNOSIS — K21.00 GASTRO-ESOPHAGEAL REFLUX DISEASE WITH ESOPHAGITIS, WITHOUT BLEEDING: ICD-10-CM

## 2024-06-28 RX ORDER — FAMOTIDINE 40 MG/1
40 TABLET, FILM COATED ORAL DAILY
Qty: 90 TABLET | Refills: 0 | Status: SHIPPED | OUTPATIENT
Start: 2024-06-28

## 2024-08-12 DIAGNOSIS — K21.00 GASTRO-ESOPHAGEAL REFLUX DISEASE WITH ESOPHAGITIS, WITHOUT BLEEDING: ICD-10-CM

## 2024-08-13 RX ORDER — DICYCLOMINE HYDROCHLORIDE 10 MG/1
10 CAPSULE ORAL DAILY
Qty: 30 CAPSULE | Refills: 0 | OUTPATIENT
Start: 2024-08-13

## 2024-12-06 DIAGNOSIS — F33.42 MAJOR DEPRESSIVE DISORDER, RECURRENT, IN FULL REMISSION (HCC): ICD-10-CM

## 2024-12-09 RX ORDER — BUPROPION HYDROCHLORIDE 150 MG/1
TABLET, EXTENDED RELEASE ORAL
Qty: 180 TABLET | Refills: 1 | OUTPATIENT
Start: 2024-12-09

## 2025-03-24 DIAGNOSIS — K21.00 GASTRO-ESOPHAGEAL REFLUX DISEASE WITH ESOPHAGITIS, WITHOUT BLEEDING: ICD-10-CM

## 2025-03-25 RX ORDER — FAMOTIDINE 40 MG/1
40 TABLET, FILM COATED ORAL DAILY
Qty: 90 TABLET | Refills: 0 | OUTPATIENT
Start: 2025-03-25

## 2025-06-25 ENCOUNTER — TRANSCRIBE ORDERS (OUTPATIENT)
Facility: HOSPITAL | Age: 70
End: 2025-06-25

## 2025-06-25 DIAGNOSIS — Z12.31 ENCOUNTER FOR SCREENING MAMMOGRAM FOR BREAST CANCER: Primary | ICD-10-CM

## 2025-07-02 ENCOUNTER — HOSPITAL ENCOUNTER (OUTPATIENT)
Facility: HOSPITAL | Age: 70
Discharge: HOME | End: 2025-07-05
Payer: MEDICARE

## 2025-07-02 VITALS — WEIGHT: 205 LBS | BODY MASS INDEX: 38.71 KG/M2 | HEIGHT: 61 IN

## 2025-07-02 DIAGNOSIS — Z12.31 ENCOUNTER FOR SCREENING MAMMOGRAM FOR BREAST CANCER: ICD-10-CM

## 2025-07-02 PROCEDURE — 77063 BREAST TOMOSYNTHESIS BI: CPT
